# Patient Record
Sex: MALE | Race: BLACK OR AFRICAN AMERICAN | NOT HISPANIC OR LATINO | Employment: UNEMPLOYED | URBAN - METROPOLITAN AREA
[De-identification: names, ages, dates, MRNs, and addresses within clinical notes are randomized per-mention and may not be internally consistent; named-entity substitution may affect disease eponyms.]

---

## 2023-09-30 ENCOUNTER — HOSPITAL ENCOUNTER (EMERGENCY)
Facility: HOSPITAL | Age: 63
Discharge: HOME | End: 2023-09-30
Payer: MEDICARE

## 2023-09-30 PROCEDURE — 99281 EMR DPT VST MAYX REQ PHY/QHP: CPT

## 2023-09-30 PROCEDURE — 4500999001 HC ED NO CHARGE

## 2023-10-01 ENCOUNTER — PHARMACY VISIT (OUTPATIENT)
Dept: PHARMACY | Facility: CLINIC | Age: 63
End: 2023-10-01
Payer: COMMERCIAL

## 2023-11-26 ENCOUNTER — HOSPITAL ENCOUNTER (INPATIENT)
Facility: HOSPITAL | Age: 63
LOS: 4 days | Discharge: HOME | DRG: 304 | End: 2023-11-30
Attending: EMERGENCY MEDICINE | Admitting: STUDENT IN AN ORGANIZED HEALTH CARE EDUCATION/TRAINING PROGRAM
Payer: MEDICARE

## 2023-11-26 ENCOUNTER — APPOINTMENT (OUTPATIENT)
Dept: RADIOLOGY | Facility: HOSPITAL | Age: 63
DRG: 304 | End: 2023-11-26
Payer: MEDICARE

## 2023-11-26 ENCOUNTER — ANCILLARY PROCEDURE (OUTPATIENT)
Dept: EMERGENCY MEDICINE | Facility: HOSPITAL | Age: 63
DRG: 304 | End: 2023-11-26
Payer: MEDICARE

## 2023-11-26 DIAGNOSIS — I16.1 HYPERTENSIVE EMERGENCY: Primary | ICD-10-CM

## 2023-11-26 DIAGNOSIS — R06.02 SHORTNESS OF BREATH: ICD-10-CM

## 2023-11-26 LAB
ALBUMIN SERPL BCP-MCNC: 3.7 G/DL (ref 3.4–5)
ALP SERPL-CCNC: 82 U/L (ref 33–136)
ALT SERPL W P-5'-P-CCNC: 12 U/L (ref 10–52)
ANION GAP BLDV CALCULATED.4IONS-SCNC: 10 MMOL/L (ref 10–25)
ANION GAP SERPL CALC-SCNC: 18 MMOL/L (ref 10–20)
AST SERPL W P-5'-P-CCNC: 22 U/L (ref 9–39)
ATRIAL RATE: 74 BPM
BASE EXCESS BLDV CALC-SCNC: 8.5 MMOL/L (ref -2–3)
BASOPHILS # BLD AUTO: 0.08 X10*3/UL (ref 0–0.1)
BASOPHILS NFR BLD AUTO: 1.3 %
BILIRUB SERPL-MCNC: 0.5 MG/DL (ref 0–1.2)
BNP SERPL-MCNC: 2725 PG/ML (ref 0–99)
BODY TEMPERATURE: 37 DEGREES CELSIUS
BUN SERPL-MCNC: 47 MG/DL (ref 6–23)
CA-I BLDV-SCNC: 1.09 MMOL/L (ref 1.1–1.33)
CALCIUM SERPL-MCNC: 9.2 MG/DL (ref 8.6–10.6)
CARDIAC TROPONIN I PNL SERPL HS: 73 NG/L (ref 0–53)
CARDIAC TROPONIN I PNL SERPL HS: 78 NG/L (ref 0–53)
CHLORIDE BLDV-SCNC: 100 MMOL/L (ref 98–107)
CHLORIDE SERPL-SCNC: 97 MMOL/L (ref 98–107)
CO2 SERPL-SCNC: 33 MMOL/L (ref 21–32)
CREAT SERPL-MCNC: 12.46 MG/DL (ref 0.5–1.3)
EOSINOPHIL # BLD AUTO: 0.52 X10*3/UL (ref 0–0.7)
EOSINOPHIL NFR BLD AUTO: 8.6 %
ERYTHROCYTE [DISTWIDTH] IN BLOOD BY AUTOMATED COUNT: 15.9 % (ref 11.5–14.5)
FLUAV RNA RESP QL NAA+PROBE: NOT DETECTED
FLUBV RNA RESP QL NAA+PROBE: NOT DETECTED
GFR SERPL CREATININE-BSD FRML MDRD: 4 ML/MIN/1.73M*2
GLUCOSE BLDV-MCNC: 75 MG/DL (ref 74–99)
GLUCOSE SERPL-MCNC: 86 MG/DL (ref 74–99)
HCO3 BLDV-SCNC: 33.4 MMOL/L (ref 22–26)
HCT VFR BLD AUTO: 32.6 % (ref 41–52)
HCT VFR BLD EST: 33 % (ref 41–52)
HGB BLD-MCNC: 10.6 G/DL (ref 13.5–17.5)
HGB BLDV-MCNC: 10.9 G/DL (ref 13.5–17.5)
IMM GRANULOCYTES # BLD AUTO: 0.01 X10*3/UL (ref 0–0.7)
IMM GRANULOCYTES NFR BLD AUTO: 0.2 % (ref 0–0.9)
LACTATE BLDV-SCNC: 0.5 MMOL/L (ref 0.4–2)
LYMPHOCYTES # BLD AUTO: 1.25 X10*3/UL (ref 1.2–4.8)
LYMPHOCYTES NFR BLD AUTO: 20.8 %
MAGNESIUM SERPL-MCNC: 1.83 MG/DL (ref 1.6–2.4)
MCH RBC QN AUTO: 29.5 PG (ref 26–34)
MCHC RBC AUTO-ENTMCNC: 32.5 G/DL (ref 32–36)
MCV RBC AUTO: 91 FL (ref 80–100)
MONOCYTES # BLD AUTO: 0.76 X10*3/UL (ref 0.1–1)
MONOCYTES NFR BLD AUTO: 12.6 %
NEUTROPHILS # BLD AUTO: 3.4 X10*3/UL (ref 1.2–7.7)
NEUTROPHILS NFR BLD AUTO: 56.5 %
NRBC BLD-RTO: 0 /100 WBCS (ref 0–0)
OXYHGB MFR BLDV: 88.4 % (ref 45–75)
P AXIS: 74 DEGREES
P OFFSET: 172 MS
P ONSET: 118 MS
PCO2 BLDV: 47 MM HG (ref 41–51)
PH BLDV: 7.46 PH (ref 7.33–7.43)
PHOSPHATE SERPL-MCNC: 6.2 MG/DL (ref 2.5–4.9)
PLATELET # BLD AUTO: 158 X10*3/UL (ref 150–450)
PO2 BLDV: 65 MM HG (ref 35–45)
POTASSIUM BLDV-SCNC: 4.7 MMOL/L (ref 3.5–5.3)
POTASSIUM SERPL-SCNC: 4.6 MMOL/L (ref 3.5–5.3)
PR INTERVAL: 190 MS
PROT SERPL-MCNC: 7.1 G/DL (ref 6.4–8.2)
Q ONSET: 213 MS
QRS COUNT: 12 BEATS
QRS DURATION: 104 MS
QT INTERVAL: 456 MS
QTC CALCULATION(BAZETT): 506 MS
QTC FREDERICIA: 489 MS
R AXIS: 106 DEGREES
RBC # BLD AUTO: 3.59 X10*6/UL (ref 4.5–5.9)
SAO2 % BLDV: 91 % (ref 45–75)
SARS-COV-2 RNA RESP QL NAA+PROBE: NOT DETECTED
SODIUM BLDV-SCNC: 139 MMOL/L (ref 136–145)
SODIUM SERPL-SCNC: 143 MMOL/L (ref 136–145)
T AXIS: 12 DEGREES
T OFFSET: 441 MS
VENTRICULAR RATE: 74 BPM
WBC # BLD AUTO: 6 X10*3/UL (ref 4.4–11.3)

## 2023-11-26 PROCEDURE — 2500000005 HC RX 250 GENERAL PHARMACY W/O HCPCS: Mod: SE

## 2023-11-26 PROCEDURE — 84484 ASSAY OF TROPONIN QUANT: CPT | Performed by: EMERGENCY MEDICINE

## 2023-11-26 PROCEDURE — 94660 CPAP INITIATION&MGMT: CPT

## 2023-11-26 PROCEDURE — 36415 COLL VENOUS BLD VENIPUNCTURE: CPT | Performed by: EMERGENCY MEDICINE

## 2023-11-26 PROCEDURE — 94762 N-INVAS EAR/PLS OXIMTRY CONT: CPT

## 2023-11-26 PROCEDURE — 99291 CRITICAL CARE FIRST HOUR: CPT | Performed by: EMERGENCY MEDICINE

## 2023-11-26 PROCEDURE — 71045 X-RAY EXAM CHEST 1 VIEW: CPT | Performed by: STUDENT IN AN ORGANIZED HEALTH CARE EDUCATION/TRAINING PROGRAM

## 2023-11-26 PROCEDURE — 83735 ASSAY OF MAGNESIUM: CPT | Performed by: EMERGENCY MEDICINE

## 2023-11-26 PROCEDURE — 84100 ASSAY OF PHOSPHORUS: CPT | Performed by: EMERGENCY MEDICINE

## 2023-11-26 PROCEDURE — 85025 COMPLETE CBC W/AUTO DIFF WBC: CPT | Performed by: EMERGENCY MEDICINE

## 2023-11-26 PROCEDURE — 83036 HEMOGLOBIN GLYCOSYLATED A1C: CPT

## 2023-11-26 PROCEDURE — 83880 ASSAY OF NATRIURETIC PEPTIDE: CPT | Performed by: EMERGENCY MEDICINE

## 2023-11-26 PROCEDURE — 82330 ASSAY OF CALCIUM: CPT

## 2023-11-26 PROCEDURE — 87636 SARSCOV2 & INF A&B AMP PRB: CPT

## 2023-11-26 PROCEDURE — 80053 COMPREHEN METABOLIC PANEL: CPT | Performed by: EMERGENCY MEDICINE

## 2023-11-26 PROCEDURE — 96374 THER/PROPH/DIAG INJ IV PUSH: CPT

## 2023-11-26 PROCEDURE — 2020000001 HC ICU ROOM DAILY

## 2023-11-26 PROCEDURE — 96375 TX/PRO/DX INJ NEW DRUG ADDON: CPT

## 2023-11-26 PROCEDURE — 2500000005 HC RX 250 GENERAL PHARMACY W/O HCPCS: Mod: SE | Performed by: EMERGENCY MEDICINE

## 2023-11-26 PROCEDURE — 2500000001 HC RX 250 WO HCPCS SELF ADMINISTERED DRUGS (ALT 637 FOR MEDICARE OP): Mod: SE

## 2023-11-26 PROCEDURE — 2500000004 HC RX 250 GENERAL PHARMACY W/ HCPCS (ALT 636 FOR OP/ED): Mod: SE

## 2023-11-26 PROCEDURE — 93005 ELECTROCARDIOGRAM TRACING: CPT

## 2023-11-26 PROCEDURE — 5A09357 ASSISTANCE WITH RESPIRATORY VENTILATION, LESS THAN 24 CONSECUTIVE HOURS, CONTINUOUS POSITIVE AIRWAY PRESSURE: ICD-10-PCS | Performed by: INTERNAL MEDICINE

## 2023-11-26 PROCEDURE — 96376 TX/PRO/DX INJ SAME DRUG ADON: CPT

## 2023-11-26 PROCEDURE — 71045 X-RAY EXAM CHEST 1 VIEW: CPT

## 2023-11-26 PROCEDURE — 82728 ASSAY OF FERRITIN: CPT

## 2023-11-26 RX ORDER — ASPIRIN 325 MG
325 TABLET ORAL ONCE
Status: COMPLETED | OUTPATIENT
Start: 2023-11-26 | End: 2023-11-26

## 2023-11-26 RX ORDER — ACETAMINOPHEN 325 MG/1
975 TABLET ORAL ONCE
Status: COMPLETED | OUTPATIENT
Start: 2023-11-26 | End: 2023-11-26

## 2023-11-26 RX ORDER — NITROGLYCERIN 20 MG/100ML
5-200 INJECTION INTRAVENOUS CONTINUOUS
Status: DISCONTINUED | OUTPATIENT
Start: 2023-11-26 | End: 2023-11-27

## 2023-11-26 RX ORDER — NITROGLYCERIN 0.4 MG/1
0.4 TABLET SUBLINGUAL ONCE
Status: COMPLETED | OUTPATIENT
Start: 2023-11-26 | End: 2023-11-26

## 2023-11-26 RX ORDER — HYDRALAZINE HYDROCHLORIDE 20 MG/ML
5 INJECTION INTRAMUSCULAR; INTRAVENOUS ONCE
Status: COMPLETED | OUTPATIENT
Start: 2023-11-26 | End: 2023-11-26

## 2023-11-26 RX ORDER — NAPROXEN SODIUM 220 MG/1
TABLET, FILM COATED ORAL
Status: COMPLETED
Start: 2023-11-26 | End: 2023-11-27

## 2023-11-26 RX ORDER — HYDRALAZINE HYDROCHLORIDE 20 MG/ML
10 INJECTION INTRAMUSCULAR; INTRAVENOUS ONCE
Status: COMPLETED | OUTPATIENT
Start: 2023-11-26 | End: 2023-11-26

## 2023-11-26 RX ORDER — NITROGLYCERIN 20 MG/100ML
INJECTION INTRAVENOUS
Status: COMPLETED
Start: 2023-11-26 | End: 2023-11-26

## 2023-11-26 RX ADMIN — HYDRALAZINE HYDROCHLORIDE 5 MG: 20 INJECTION INTRAMUSCULAR; INTRAVENOUS at 21:23

## 2023-11-26 RX ADMIN — HYDRALAZINE HYDROCHLORIDE 10 MG: 20 INJECTION INTRAMUSCULAR; INTRAVENOUS at 22:24

## 2023-11-26 RX ADMIN — NITROGLYCERIN 0.4 MG: 0.4 TABLET SUBLINGUAL at 23:10

## 2023-11-26 RX ADMIN — ACETAMINOPHEN 975 MG: 325 TABLET ORAL at 21:23

## 2023-11-26 RX ADMIN — Medication 30 PERCENT: at 22:50

## 2023-11-26 RX ADMIN — NITROGLYCERIN 10 MCG/MIN: 20 INJECTION INTRAVENOUS at 23:33

## 2023-11-26 RX ADMIN — ASPIRIN 325 MG ORAL TABLET 325 MG: 325 PILL ORAL at 21:23

## 2023-11-26 RX ADMIN — NITROGLYCERIN 0.4 MG: 0.4 TABLET SUBLINGUAL at 21:29

## 2023-11-26 RX ADMIN — NITROGLYCERIN 50000 MCG: 20 INJECTION INTRAVENOUS at 23:11

## 2023-11-26 ASSESSMENT — COLUMBIA-SUICIDE SEVERITY RATING SCALE - C-SSRS
2. HAVE YOU ACTUALLY HAD ANY THOUGHTS OF KILLING YOURSELF?: NO
1. IN THE PAST MONTH, HAVE YOU WISHED YOU WERE DEAD OR WISHED YOU COULD GO TO SLEEP AND NOT WAKE UP?: NO
6. HAVE YOU EVER DONE ANYTHING, STARTED TO DO ANYTHING, OR PREPARED TO DO ANYTHING TO END YOUR LIFE?: NO

## 2023-11-26 ASSESSMENT — LIFESTYLE VARIABLES
EVER HAD A DRINK FIRST THING IN THE MORNING TO STEADY YOUR NERVES TO GET RID OF A HANGOVER: NO
REASON UNABLE TO ASSESS: NO
HAVE YOU EVER FELT YOU SHOULD CUT DOWN ON YOUR DRINKING: NO
EVER FELT BAD OR GUILTY ABOUT YOUR DRINKING: NO
HAVE PEOPLE ANNOYED YOU BY CRITICIZING YOUR DRINKING: NO

## 2023-11-26 ASSESSMENT — PAIN - FUNCTIONAL ASSESSMENT: PAIN_FUNCTIONAL_ASSESSMENT: 0-10

## 2023-11-26 ASSESSMENT — PAIN DESCRIPTION - LOCATION: LOCATION: CHEST

## 2023-11-27 ENCOUNTER — APPOINTMENT (OUTPATIENT)
Dept: RADIOLOGY | Facility: HOSPITAL | Age: 63
DRG: 304 | End: 2023-11-27
Payer: MEDICARE

## 2023-11-27 ENCOUNTER — APPOINTMENT (OUTPATIENT)
Dept: DIALYSIS | Facility: HOSPITAL | Age: 63
End: 2023-11-27
Payer: MEDICARE

## 2023-11-27 ENCOUNTER — HOSPITAL ENCOUNTER (OUTPATIENT)
Dept: CARDIOLOGY | Facility: CLINIC | Age: 63
Discharge: HOME | End: 2023-11-27
Payer: MEDICARE

## 2023-11-27 LAB
ALBUMIN SERPL BCP-MCNC: 4.2 G/DL (ref 3.4–5)
ANION GAP SERPL CALC-SCNC: 19 MMOL/L (ref 10–20)
BASOPHILS # BLD AUTO: 0.1 X10*3/UL (ref 0–0.1)
BASOPHILS NFR BLD AUTO: 1.4 %
BUN SERPL-MCNC: 28 MG/DL (ref 6–23)
CALCIUM SERPL-MCNC: 9.1 MG/DL (ref 8.6–10.6)
CHLORIDE SERPL-SCNC: 96 MMOL/L (ref 98–107)
CO2 SERPL-SCNC: 28 MMOL/L (ref 21–32)
CREAT SERPL-MCNC: 8.03 MG/DL (ref 0.5–1.3)
EOSINOPHIL # BLD AUTO: 0.55 X10*3/UL (ref 0–0.7)
EOSINOPHIL NFR BLD AUTO: 7.5 %
ERYTHROCYTE [DISTWIDTH] IN BLOOD BY AUTOMATED COUNT: 16.1 % (ref 11.5–14.5)
EST. AVERAGE GLUCOSE BLD GHB EST-MCNC: 82 MG/DL
FERRITIN SERPL-MCNC: 681 NG/ML (ref 20–300)
FOLATE SERPL-MCNC: 14.6 NG/ML
GFR SERPL CREATININE-BSD FRML MDRD: 7 ML/MIN/1.73M*2
GLUCOSE BLD MANUAL STRIP-MCNC: 133 MG/DL (ref 74–99)
GLUCOSE BLD MANUAL STRIP-MCNC: 65 MG/DL (ref 74–99)
GLUCOSE SERPL-MCNC: 94 MG/DL (ref 74–99)
HBA1C MFR BLD: 4.5 %
HCT VFR BLD AUTO: 33.9 % (ref 41–52)
HGB BLD-MCNC: 10.9 G/DL (ref 13.5–17.5)
IMM GRANULOCYTES # BLD AUTO: 0.02 X10*3/UL (ref 0–0.7)
IMM GRANULOCYTES NFR BLD AUTO: 0.3 % (ref 0–0.9)
IRON SATN MFR SERPL: 19 %
IRON SERPL-MCNC: 44 UG/DL (ref 35–150)
LYMPHOCYTES # BLD AUTO: 1.21 X10*3/UL (ref 1.2–4.8)
LYMPHOCYTES NFR BLD AUTO: 16.5 %
MAGNESIUM SERPL-MCNC: 1.93 MG/DL (ref 1.6–2.4)
MCH RBC QN AUTO: 29.2 PG (ref 26–34)
MCHC RBC AUTO-ENTMCNC: 32.2 G/DL (ref 32–36)
MCV RBC AUTO: 91 FL (ref 80–100)
MONOCYTES # BLD AUTO: 0.82 X10*3/UL (ref 0.1–1)
MONOCYTES NFR BLD AUTO: 11.2 %
NEUTROPHILS # BLD AUTO: 4.64 X10*3/UL (ref 1.2–7.7)
NEUTROPHILS NFR BLD AUTO: 63.1 %
NRBC BLD-RTO: 0 /100 WBCS (ref 0–0)
PHOSPHATE SERPL-MCNC: 3.6 MG/DL (ref 2.5–4.9)
PLATELET # BLD AUTO: 172 X10*3/UL (ref 150–450)
POTASSIUM SERPL-SCNC: 4.5 MMOL/L (ref 3.5–5.3)
RBC # BLD AUTO: 3.73 X10*6/UL (ref 4.5–5.9)
SODIUM SERPL-SCNC: 138 MMOL/L (ref 136–145)
TIBC SERPL-MCNC: 226 UG/DL
UIBC SERPL-MCNC: 182 UG/DL (ref 110–370)
VIT B12 SERPL-MCNC: 406 PG/ML (ref 211–911)
WBC # BLD AUTO: 7.3 X10*3/UL (ref 4.4–11.3)

## 2023-11-27 PROCEDURE — 93971 EXTREMITY STUDY: CPT

## 2023-11-27 PROCEDURE — 93971 EXTREMITY STUDY: CPT | Performed by: RADIOLOGY

## 2023-11-27 PROCEDURE — 83735 ASSAY OF MAGNESIUM: CPT

## 2023-11-27 PROCEDURE — 99233 SBSQ HOSP IP/OBS HIGH 50: CPT

## 2023-11-27 PROCEDURE — 94762 N-INVAS EAR/PLS OXIMTRY CONT: CPT

## 2023-11-27 PROCEDURE — 82746 ASSAY OF FOLIC ACID SERUM: CPT

## 2023-11-27 PROCEDURE — 2500000001 HC RX 250 WO HCPCS SELF ADMINISTERED DRUGS (ALT 637 FOR MEDICARE OP)

## 2023-11-27 PROCEDURE — 99291 CRITICAL CARE FIRST HOUR: CPT

## 2023-11-27 PROCEDURE — 8010000001 HC DIALYSIS - HEMODIALYSIS PER DAY

## 2023-11-27 PROCEDURE — 36415 COLL VENOUS BLD VENIPUNCTURE: CPT

## 2023-11-27 PROCEDURE — 82607 VITAMIN B-12: CPT

## 2023-11-27 PROCEDURE — 1200000002 HC GENERAL ROOM WITH TELEMETRY DAILY

## 2023-11-27 PROCEDURE — 83540 ASSAY OF IRON: CPT

## 2023-11-27 PROCEDURE — 5A1D70Z PERFORMANCE OF URINARY FILTRATION, INTERMITTENT, LESS THAN 6 HOURS PER DAY: ICD-10-PCS | Performed by: INTERNAL MEDICINE

## 2023-11-27 PROCEDURE — 85025 COMPLETE CBC W/AUTO DIFF WBC: CPT

## 2023-11-27 PROCEDURE — 2500000004 HC RX 250 GENERAL PHARMACY W/ HCPCS (ALT 636 FOR OP/ED)

## 2023-11-27 PROCEDURE — 99223 1ST HOSP IP/OBS HIGH 75: CPT

## 2023-11-27 PROCEDURE — 99221 1ST HOSP IP/OBS SF/LOW 40: CPT | Performed by: INTERNAL MEDICINE

## 2023-11-27 PROCEDURE — 93010 ELECTROCARDIOGRAM REPORT: CPT | Performed by: INTERNAL MEDICINE

## 2023-11-27 PROCEDURE — 80069 RENAL FUNCTION PANEL: CPT

## 2023-11-27 PROCEDURE — 93005 ELECTROCARDIOGRAM TRACING: CPT

## 2023-11-27 PROCEDURE — 82947 ASSAY GLUCOSE BLOOD QUANT: CPT

## 2023-11-27 RX ORDER — HYDRALAZINE HYDROCHLORIDE 50 MG/1
100 TABLET, FILM COATED ORAL EVERY 8 HOURS
Status: DISCONTINUED | OUTPATIENT
Start: 2023-11-27 | End: 2023-11-27

## 2023-11-27 RX ORDER — HYDRALAZINE HYDROCHLORIDE 50 MG/1
100 TABLET, FILM COATED ORAL EVERY 8 HOURS
Status: DISCONTINUED | OUTPATIENT
Start: 2023-11-27 | End: 2023-11-30 | Stop reason: HOSPADM

## 2023-11-27 RX ORDER — HEPARIN SODIUM 5000 [USP'U]/ML
5000 INJECTION, SOLUTION INTRAVENOUS; SUBCUTANEOUS EVERY 8 HOURS
Status: DISCONTINUED | OUTPATIENT
Start: 2023-11-27 | End: 2023-11-30 | Stop reason: HOSPADM

## 2023-11-27 RX ORDER — CLONIDINE 0.1 MG/24H
1 PATCH, EXTENDED RELEASE TRANSDERMAL
Status: DISCONTINUED | OUTPATIENT
Start: 2023-11-28 | End: 2023-11-28

## 2023-11-27 RX ORDER — LABETALOL 200 MG/1
1 TABLET, FILM COATED ORAL EVERY 12 HOURS
Status: DISCONTINUED | OUTPATIENT
Start: 2023-11-27 | End: 2023-11-27

## 2023-11-27 RX ORDER — ATORVASTATIN CALCIUM 40 MG/1
40 TABLET, FILM COATED ORAL DAILY
Status: DISCONTINUED | OUTPATIENT
Start: 2023-11-27 | End: 2023-11-30 | Stop reason: HOSPADM

## 2023-11-27 RX ORDER — LABETALOL 200 MG/1
1 TABLET, FILM COATED ORAL EVERY 12 HOURS
Status: DISCONTINUED | OUTPATIENT
Start: 2023-11-27 | End: 2023-11-29

## 2023-11-27 RX ORDER — NAPROXEN SODIUM 220 MG/1
81 TABLET, FILM COATED ORAL DAILY
Status: DISCONTINUED | OUTPATIENT
Start: 2023-11-27 | End: 2023-11-30 | Stop reason: HOSPADM

## 2023-11-27 RX ORDER — HYDRALAZINE HYDROCHLORIDE 100 MG/1
100 TABLET, FILM COATED ORAL EVERY 8 HOURS
Status: DISCONTINUED | OUTPATIENT
Start: 2023-11-27 | End: 2023-11-27

## 2023-11-27 RX ORDER — HYDRALAZINE HYDROCHLORIDE 25 MG/1
25 TABLET, FILM COATED ORAL ONCE
Status: COMPLETED | OUTPATIENT
Start: 2023-11-27 | End: 2023-11-27

## 2023-11-27 RX ORDER — NIFEDIPINE 60 MG/1
60 TABLET, FILM COATED, EXTENDED RELEASE ORAL DAILY
Status: DISCONTINUED | OUTPATIENT
Start: 2023-11-27 | End: 2023-11-27

## 2023-11-27 RX ORDER — LOSARTAN POTASSIUM 100 MG/1
100 TABLET ORAL DAILY
Status: DISCONTINUED | OUTPATIENT
Start: 2023-11-27 | End: 2023-11-27

## 2023-11-27 RX ORDER — NITROGLYCERIN 20 MG/100ML
5-200 INJECTION INTRAVENOUS CONTINUOUS
Status: DISCONTINUED | OUTPATIENT
Start: 2023-11-27 | End: 2023-11-27

## 2023-11-27 RX ORDER — LOSARTAN POTASSIUM 100 MG/1
100 TABLET ORAL DAILY
Status: DISCONTINUED | OUTPATIENT
Start: 2023-11-27 | End: 2023-11-30 | Stop reason: HOSPADM

## 2023-11-27 RX ORDER — NIFEDIPINE 60 MG/1
60 TABLET, FILM COATED, EXTENDED RELEASE ORAL DAILY
Status: DISCONTINUED | OUTPATIENT
Start: 2023-11-27 | End: 2023-11-28

## 2023-11-27 RX ADMIN — HYDRALAZINE HYDROCHLORIDE 100 MG: 50 TABLET, FILM COATED ORAL at 12:48

## 2023-11-27 RX ADMIN — NIFEDIPINE 60 MG: 60 TABLET, FILM COATED, EXTENDED RELEASE ORAL at 09:16

## 2023-11-27 RX ADMIN — LABETALOL HYDROCHLORIDE 200 MG: 200 TABLET, FILM COATED ORAL at 19:41

## 2023-11-27 RX ADMIN — HYDRALAZINE HYDROCHLORIDE 25 MG: 25 TABLET ORAL at 14:15

## 2023-11-27 RX ADMIN — HYDRALAZINE HYDROCHLORIDE 100 MG: 50 TABLET, FILM COATED ORAL at 19:42

## 2023-11-27 RX ADMIN — LABETALOL HYDROCHLORIDE 200 MG: 200 TABLET, FILM COATED ORAL at 04:46

## 2023-11-27 RX ADMIN — RENO CAPS 1 CAPSULE: 100; 1.5; 1.7; 20; 10; 1; 150; 5; 6 CAPSULE ORAL at 09:16

## 2023-11-27 RX ADMIN — HYDRALAZINE HYDROCHLORIDE 100 MG: 50 TABLET, FILM COATED ORAL at 04:06

## 2023-11-27 RX ADMIN — LOSARTAN POTASSIUM 100 MG: 100 TABLET, FILM COATED ORAL at 09:15

## 2023-11-27 RX ADMIN — ATORVASTATIN CALCIUM 40 MG: 40 TABLET, FILM COATED ORAL at 09:15

## 2023-11-27 RX ADMIN — ASPIRIN 81 MG CHEWABLE TABLET 81 MG: 81 TABLET CHEWABLE at 09:16

## 2023-11-27 SDOH — ECONOMIC STABILITY: HOUSING INSECURITY: IN THE LAST 12 MONTHS, HOW MANY PLACES HAVE YOU LIVED?: 1

## 2023-11-27 SDOH — ECONOMIC STABILITY: FOOD INSECURITY: WITHIN THE PAST 12 MONTHS, THE FOOD YOU BOUGHT JUST DIDN'T LAST AND YOU DIDN'T HAVE MONEY TO GET MORE.: SOMETIMES TRUE

## 2023-11-27 SDOH — SOCIAL STABILITY: SOCIAL INSECURITY: ARE THERE ANY APPARENT SIGNS OF INJURIES/BEHAVIORS THAT COULD BE RELATED TO ABUSE/NEGLECT?: NO

## 2023-11-27 SDOH — ECONOMIC STABILITY: INCOME INSECURITY: HOW HARD IS IT FOR YOU TO PAY FOR THE VERY BASICS LIKE FOOD, HOUSING, MEDICAL CARE, AND HEATING?: SOMEWHAT HARD

## 2023-11-27 SDOH — SOCIAL STABILITY: SOCIAL INSECURITY: ARE YOU OR HAVE YOU BEEN THREATENED OR ABUSED PHYSICALLY, EMOTIONALLY, OR SEXUALLY BY ANYONE?: NO

## 2023-11-27 SDOH — ECONOMIC STABILITY: HOUSING INSECURITY
IN THE LAST 12 MONTHS, WAS THERE A TIME WHEN YOU DID NOT HAVE A STEADY PLACE TO SLEEP OR SLEPT IN A SHELTER (INCLUDING NOW)?: NO

## 2023-11-27 SDOH — SOCIAL STABILITY: SOCIAL INSECURITY: DO YOU FEEL ANYONE HAS EXPLOITED OR TAKEN ADVANTAGE OF YOU FINANCIALLY OR OF YOUR PERSONAL PROPERTY?: NO

## 2023-11-27 SDOH — ECONOMIC STABILITY: FOOD INSECURITY: WITHIN THE PAST 12 MONTHS, YOU WORRIED THAT YOUR FOOD WOULD RUN OUT BEFORE YOU GOT MONEY TO BUY MORE.: SOMETIMES TRUE

## 2023-11-27 SDOH — ECONOMIC STABILITY: TRANSPORTATION INSECURITY
IN THE PAST 12 MONTHS, HAS LACK OF TRANSPORTATION KEPT YOU FROM MEETINGS, WORK, OR FROM GETTING THINGS NEEDED FOR DAILY LIVING?: NO

## 2023-11-27 SDOH — SOCIAL STABILITY: SOCIAL INSECURITY: DOES ANYONE TRY TO KEEP YOU FROM HAVING/CONTACTING OTHER FRIENDS OR DOING THINGS OUTSIDE YOUR HOME?: NO

## 2023-11-27 SDOH — SOCIAL STABILITY: SOCIAL INSECURITY: DO YOU FEEL UNSAFE GOING BACK TO THE PLACE WHERE YOU ARE LIVING?: NO

## 2023-11-27 SDOH — ECONOMIC STABILITY: INCOME INSECURITY: IN THE PAST 12 MONTHS, HAS THE ELECTRIC, GAS, OIL, OR WATER COMPANY THREATENED TO SHUT OFF SERVICE IN YOUR HOME?: NO

## 2023-11-27 SDOH — ECONOMIC STABILITY: INCOME INSECURITY: IN THE LAST 12 MONTHS, WAS THERE A TIME WHEN YOU WERE NOT ABLE TO PAY THE MORTGAGE OR RENT ON TIME?: NO

## 2023-11-27 SDOH — SOCIAL STABILITY: SOCIAL INSECURITY: ABUSE: ADULT

## 2023-11-27 SDOH — SOCIAL STABILITY: SOCIAL INSECURITY: HAS ANYONE EVER THREATENED TO HURT YOUR FAMILY OR YOUR PETS?: NO

## 2023-11-27 SDOH — ECONOMIC STABILITY: TRANSPORTATION INSECURITY
IN THE PAST 12 MONTHS, HAS THE LACK OF TRANSPORTATION KEPT YOU FROM MEDICAL APPOINTMENTS OR FROM GETTING MEDICATIONS?: NO

## 2023-11-27 SDOH — SOCIAL STABILITY: SOCIAL INSECURITY: HAVE YOU HAD THOUGHTS OF HARMING ANYONE ELSE?: NO

## 2023-11-27 ASSESSMENT — LIFESTYLE VARIABLES
HOW OFTEN DO YOU HAVE A DRINK CONTAINING ALCOHOL: MONTHLY OR LESS
HOW MANY STANDARD DRINKS CONTAINING ALCOHOL DO YOU HAVE ON A TYPICAL DAY: 1 OR 2
HOW OFTEN DO YOU HAVE 6 OR MORE DRINKS ON ONE OCCASION: NEVER
SKIP TO QUESTIONS 9-10: 1
AUDIT-C TOTAL SCORE: 1
AUDIT-C TOTAL SCORE: 1
SUBSTANCE_ABUSE_PAST_12_MONTHS: YES
PRESCIPTION_ABUSE_PAST_12_MONTHS: NO

## 2023-11-27 ASSESSMENT — COGNITIVE AND FUNCTIONAL STATUS - GENERAL
DAILY ACTIVITIY SCORE: 24
DAILY ACTIVITIY SCORE: 24
STANDING UP FROM CHAIR USING ARMS: A LITTLE
CLIMB 3 TO 5 STEPS WITH RAILING: A LITTLE
MOBILITY SCORE: 20
MOBILITY SCORE: 24
WALKING IN HOSPITAL ROOM: A LITTLE
PATIENT BASELINE BEDBOUND: NO
MOVING TO AND FROM BED TO CHAIR: A LITTLE

## 2023-11-27 ASSESSMENT — PAIN - FUNCTIONAL ASSESSMENT
PAIN_FUNCTIONAL_ASSESSMENT: 0-10

## 2023-11-27 ASSESSMENT — PAIN SCALES - GENERAL
PAINLEVEL_OUTOF10: 0 - NO PAIN

## 2023-11-27 ASSESSMENT — PATIENT HEALTH QUESTIONNAIRE - PHQ9
1. LITTLE INTEREST OR PLEASURE IN DOING THINGS: NOT AT ALL
2. FEELING DOWN, DEPRESSED OR HOPELESS: NOT AT ALL
SUM OF ALL RESPONSES TO PHQ9 QUESTIONS 1 & 2: 0

## 2023-11-27 ASSESSMENT — ACTIVITIES OF DAILY LIVING (ADL)
FEEDING YOURSELF: INDEPENDENT
BATHING: INDEPENDENT
GROOMING: INDEPENDENT
HEARING - RIGHT EAR: FUNCTIONAL
LACK_OF_TRANSPORTATION: NO
JUDGMENT_ADEQUATE_SAFELY_COMPLETE_DAILY_ACTIVITIES: YES
WALKS IN HOME: INDEPENDENT
HEARING - LEFT EAR: FUNCTIONAL
ADEQUATE_TO_COMPLETE_ADL: YES
PATIENT'S MEMORY ADEQUATE TO SAFELY COMPLETE DAILY ACTIVITIES?: YES
TOILETING: INDEPENDENT
DRESSING YOURSELF: INDEPENDENT

## 2023-11-27 NOTE — PROGRESS NOTES
Marcelino Connors is a 62 y.o. male on day 1 of admission presenting with Hypertensive emergency.  The patient was not available to participate in the assessment process. I called Namrata, who is listed as his POA. Per Namrata the patient lives with his brother Josi. He receives less than $100 for food stamps. If/when needed the family member will pool together to make sure there enough food in the house. I had no other concerns that would delay his dc. Sw will continue to follow for psychosocial needs and discharge planning.   EDGARD Powell

## 2023-11-27 NOTE — SIGNIFICANT EVENT
Floor Readiness Note       I, personally, evaluated Marcelino Connors prior to transfer to the floor, including reviewing all current laboratory and imaging studies. The patient remains appropriate for transfer to the floor. Bedside nurse and respiratory therapy are also in agreement of patient's readiness for the floor.     Brief summary:  Mr. Marcelino Connors is a 63 yo M w/ PMH of anuric ESRD (on HD MWF), HTN, and HFpEF (EF 60-65% 3/8/2022) 2/2 chronic HTN here for acute hypoxic respiratory failure 2/2 pulmonary edema d/t hypertensive emergency, and volume overload. Nephrology was consulted and patient underwent urgent HD (now s/p 3L removed) BP has since improved and nitroglycerin GGT has since been dc. His respiratory function has since returned to baseline and home BP meds were resumed.      Updated focused Physical Exam:  Physical Exam:   General: NAD, L UE AVF w/ audible bruit  Cardio: RRR, no rubs/murmurs, no S3/S4  Pulm: non-labored, no accessory muscle usage, no crackles/wheezing/stridor  GI: non-distended, appropriately soft, no masses, non-tender  : no CVA tenderness  MSK: no joint swelling, grossly full active ROM  Extremities: no cyanosis, distal extremities warm, no bilat LE edema  HEENT: grossly normocephalic  Neuro: grossly oriented, CN grossly intact, extremities moving symmetrically  Psych: appropriate affect    Current Vital Signs:  Heart Rate: 73 (11/27/23 1200 : Ingrid Bush RN)  BP: (!) 189/89 (11/27/23 1200 : Ingrid Bush, RN)  Temp: 37 °C (98.6 °F) (11/27/23 1100 : Deedee Reese)  Resp: 20 (11/27/23 1200 : Ingrid Bush, RN)  SpO2: 97 % (11/27/23 1200 : Ingrid Bush, RN)    Relevant updates since rounds:  None     Accepting team, Hospitalist C, received verbal sign out and the Provider Care team/Attending has been updated. Bedside nurse will now call accepting nurse for report and patient will be transferred to [place] [id number].    Linda Ferrer MD   PGY-1 Internal Medicine   
Unable to formally assess due to altered mentation with reduced compliance.

## 2023-11-27 NOTE — H&P
S  CC  Acute hypoxic respiratory failure 2/2 pulmonary edema (on CPAP in ED), hypertensive emergency (on nitroglycerin ggt) in s/o volume overload/ESRD    HPI  Mr. Marcelino Connors is a 61 yo M w/ PMH most pertinent for anuric ESRD 2/2 hypertensive nephropathy now on iHD [facility] ([facility]) via L UE AVF, HTN c/b multiple presentations for urgency/emergency, and HFpEF (EF 60-65% 3/8/2022) likely 2/2 chronic HTN.    Presented to hospital d/t new dyspnea this AM. Does not wear supplemental O2 at home. Per pt last iHD session at [facility] was Friday 11/24. Thinks they took off about 2 L, says tolerated full session. Does not make urine at baseline. Not sure what meds he takes but says takes about 8 pills a day, some once daily, some twice daily, and some thrice daily. Has been eating more salty foods over the holiday.    No new neuro deficits. No headache, dizziness, or LH. No CP. SOB now improved back to baseline on arrival to MICU. No abdominal pain. No n/v/d/c. No new LE pain or swelling. No fevers, chills, or sweats.    No headache on the nitroglycerin ggt.    ED course:  VT: T 98.8, P 77, /120, SpO2 96% (on LFNC). CBC w/o leukocytosis (WBC 6.0), Hb at baseline (10.6, recent baseline 10-11, previously 14-16), . BMP notable for K 4.6, HCO3 33, BUN 47, P 6.2. LFTs grossly WNL. BNP 2725 (baseline 1,629) in s/o BMI 21. Troponin plateaued, 79 to 73. VBG: pH 7.46, pCO2 47, lactate 0.5. Covid neg. Flu A/B neg. CXR w/ pulmonary edema and small b/l pleural effusions. Received hydralazine 10mg IV, hydralazine 5mgt IV, aspirin 325mg, nitroglycerin 0.4mg SL x3. Started on nitroglycerin ggt.    CARDs Hx:  TTE 3/8/2022:  1. The left ventricular systolic function is normal with a 60-65% estimated ejection fraction.  2. Spectral Doppler shows a restrictive pattern of left ventricular diastolic filling.  3. There is severe concentric left ventricular hypertrophy.  4. The left atrium is severely dilated.  5. The  right atrium is mild to moderately dilated.    ROS  10-point ROS otherwise negative except for above.    PMH  -see HPI    ALL  No Known Allergies     MEDs  Current Outpatient Medications   Medication Instructions    aspirin 81 mg chewable tablet CHEW 1 TABLET BY MOUTH ONCE DAILY    atorvastatin (Lipitor) 40 mg tablet TAKE 1 TABLET BY MOUTH ONCE DAILY    B complex-vitamin C-folic acid (Nephrocaps) 1 mg capsule TAKE 1 CAPSULE BY MOUTH ONCE DAILY    cloNIDine (Catapres-TTS) 0.1 mg/24 hr patch APPLY 1 PATCH TRANSDERMAL ONCE WEEKLY ON TUESDAY.    hydrALAZINE (Apresoline) 100 mg tablet TAKE 1 TABLET BY MOUTH EVERY 8 HOURS    hydrALAZINE (Apresoline) 100 mg tablet TAKE 1 TABLET BY MOUTH EVERY 8 HOURS    isosorbide mononitrate ER (Imdur) 120 mg 24 hr tablet TAKE 1 TABLET BY MOUTH ONCE DAILY.    labetalol (Normodyne) 200 mg tablet TAKE 1 TABLET BY MOUTH EVERY 12 HOURS    losartan (Cozaar) 100 mg tablet TAKE 1 TABLET BY MOUTH ONCE DAILY    losartan (Cozaar) 100 mg tablet TAKE 1 TABLET BY MOUTH ONCE DAILY    NIFEdipine ER (Adalat CC) 60 mg 24 hr tablet TAKE 1 TABLET BY MOUTH ONCE DAILY      PSH  -reviewed (non-contributory)    SOC  -tobacco: denies  -alcohol: denies  -substance: smokes cannabis  -barriers to healthcare: denies  -code status: full  -surrogate decision maker: Luna Robles (adult son) [phone]      O  VT  Temp:  [37 °C (98.6 °F)-37.1 °C (98.8 °F)] 37 °C (98.6 °F)  Heart Rate:  [74-82] 79  Resp:  [13-27] 26  BP: (169-240)/() 169/93  FiO2 (%):  [30 %-36 %] 36 %     RESP  FiO2 (%):  [30 %-36 %] 36 %  S RR:  [16] 16     I/O    Intake/Output Summary (Last 24 hours) at 11/27/2023 0212  Last data filed at 11/27/2023 0130  Gross per 24 hour   Intake 265.28 ml   Output --   Net 265.28 ml      PE  General: no acute distress, on LFNC, L UE AVF w/ audible bruit  Cardio: normal rate, regular rhythm, no rubs/murmurs, no S3/S4  Pulm: non-labored, no accessory muscle usage, BS symmetric, no  crackles/wheezing/stridor  GI: non-distended, appropriately soft, no masses, non-tender  : no CVA tenderness  MSK: no joint swelling, grossly full active ROM  HEENT: grossly normocephalic  Neuro: grossly oriented, CN grossly intact, extremities moving symmetrically  Psych: appropriate affect  Volume: mucous membranes moist, mild LE pitting edema  Perfusion: no cyanosis, distal extremities warm    LAB  Results for orders placed or performed during the hospital encounter of 11/26/23 (from the past 24 hour(s))   CBC and Auto Differential   Result Value Ref Range    WBC 6.0 4.4 - 11.3 x10*3/uL    nRBC 0.0 0.0 - 0.0 /100 WBCs    RBC 3.59 (L) 4.50 - 5.90 x10*6/uL    Hemoglobin 10.6 (L) 13.5 - 17.5 g/dL    Hematocrit 32.6 (L) 41.0 - 52.0 %    MCV 91 80 - 100 fL    MCH 29.5 26.0 - 34.0 pg    MCHC 32.5 32.0 - 36.0 g/dL    RDW 15.9 (H) 11.5 - 14.5 %    Platelets 158 150 - 450 x10*3/uL    Neutrophils % 56.5 40.0 - 80.0 %    Immature Granulocytes %, Automated 0.2 0.0 - 0.9 %    Lymphocytes % 20.8 13.0 - 44.0 %    Monocytes % 12.6 2.0 - 10.0 %    Eosinophils % 8.6 0.0 - 6.0 %    Basophils % 1.3 0.0 - 2.0 %    Neutrophils Absolute 3.40 1.20 - 7.70 x10*3/uL    Immature Granulocytes Absolute, Automated 0.01 0.00 - 0.70 x10*3/uL    Lymphocytes Absolute 1.25 1.20 - 4.80 x10*3/uL    Monocytes Absolute 0.76 0.10 - 1.00 x10*3/uL    Eosinophils Absolute 0.52 0.00 - 0.70 x10*3/uL    Basophils Absolute 0.08 0.00 - 0.10 x10*3/uL   Comprehensive Metabolic Panel   Result Value Ref Range    Glucose 86 74 - 99 mg/dL    Sodium 143 136 - 145 mmol/L    Potassium 4.6 3.5 - 5.3 mmol/L    Chloride 97 (L) 98 - 107 mmol/L    Bicarbonate 33 (H) 21 - 32 mmol/L    Anion Gap 18 10 - 20 mmol/L    Urea Nitrogen 47 (H) 6 - 23 mg/dL    Creatinine 12.46 (H) 0.50 - 1.30 mg/dL    eGFR 4 (L) >60 mL/min/1.73m*2    Calcium 9.2 8.6 - 10.6 mg/dL    Albumin 3.7 3.4 - 5.0 g/dL    Alkaline Phosphatase 82 33 - 136 U/L    Total Protein 7.1 6.4 - 8.2 g/dL    AST 22 9 - 39  U/L    Bilirubin, Total 0.5 0.0 - 1.2 mg/dL    ALT 12 10 - 52 U/L   Troponin I, High Sensitivity   Result Value Ref Range    Troponin I, High Sensitivity 78 (H) 0 - 53 ng/L   B-Type Natriuretic Peptide   Result Value Ref Range    BNP 2,725 (H) 0 - 99 pg/mL   Phosphorus   Result Value Ref Range    Phosphorus 6.2 (H) 2.5 - 4.9 mg/dL   Magnesium   Result Value Ref Range    Magnesium 1.83 1.60 - 2.40 mg/dL   Blood Gas Venous Full Panel Unsolicited   Result Value Ref Range    POCT pH, Venous 7.46 (H) 7.33 - 7.43 pH    POCT pCO2, Venous 47 41 - 51 mm Hg    POCT pO2, Venous 65 (H) 35 - 45 mm Hg    POCT SO2, Venous 91 (H) 45 - 75 %    POCT Oxy Hemoglobin, Venous 88.4 (H) 45.0 - 75.0 %    POCT Hematocrit Calculated, Venous 33.0 (L) 41.0 - 52.0 %    POCT Sodium, Venous 139 136 - 145 mmol/L    POCT Potassium, Venous 4.7 3.5 - 5.3 mmol/L    POCT Chloride, Venous 100 98 - 107 mmol/L    POCT Ionized Calicum, Venous 1.09 (L) 1.10 - 1.33 mmol/L    POCT Glucose, Venous 75 74 - 99 mg/dL    POCT Lactate, Venous 0.5 0.4 - 2.0 mmol/L    POCT Base Excess, Venous 8.5 (H) -2.0 - 3.0 mmol/L    POCT HCO3 Calculated, Venous 33.4 (H) 22.0 - 26.0 mmol/L    POCT Hemoglobin, Venous 10.9 (L) 13.5 - 17.5 g/dL    POCT Anion Gap, Venous 10.0 10.0 - 25.0 mmol/L    Patient Temperature 37.0 degrees Celsius   Troponin I, High Sensitivity   Result Value Ref Range    Troponin I, High Sensitivity 73 (H) 0 - 53 ng/L   Sars-CoV-2 PCR, Symptomatic   Result Value Ref Range    Coronavirus 2019, PCR Not Detected Not Detected   Influenza A, and B PCR   Result Value Ref Range    Flu A Result Not Detected Not Detected    Flu B Result Not Detected Not Detected   ECG 12 lead   Result Value Ref Range    Ventricular Rate 74 BPM    Atrial Rate 74 BPM    AR Interval 190 ms    QRS Duration 104 ms    QT Interval 456 ms    QTC Calculation(Bazett) 506 ms    P Axis 74 degrees    R Axis 106 degrees    T Axis 12 degrees    QRS Count 12 beats    Q Onset 213 ms    P Onset 118 ms     P Offset 172 ms    T Offset 441 ms    QTC Fredericia 489 ms     IMG  Lower extremity venous duplex right  Result Date: 11/27/2023  1. Chronic appearing clot in the mid to distal right femoral vein with intact venous flow. 2. Remainder of the evaluated veins in the right lower extremity demonstrate no evidence of deep vein thrombosis. 3. 4.8 cm complex fluid collection in the right popliteal fossa is indeterminate but likely represents a Baker's cyst.    XR chest 1 view  Result Date: 11/26/2023  1.  Findings suspicious for pulmonary edema and small bilateral pleural effusions.       A/P  Mr. Marcelino Connors is a 61 yo M w/ PMH most pertinent for anuric ESRD 2/2 hypertensive nephropathy now on iHD [facility] ([facility]) via L UE AVF, HTN c/b multiple presentations for urgency/emergency, and HFpEF (EF 60-65% 3/8/2022) likely 2/2 chronic HTN. Presented to [facility] ED (11/26) w/ CC acute dyspnea. Found to have acute hypoxic respiratory failure (no O2 at baseline) likely 2/2 pulmonary edema (per CXR) d/t hypertensive emergency (/120), volume overload (ESRD, increased salt intake over holiday). BP improving w/ adding home BP meds and nitroglycerin ggt, goal 25% MAP reduction in first 6 hours. S/p CPAP in ED, now on LFNC, continuing to wean to room air. Nephrology planning for urgent iHD w/ UF goal 3-4 L as tolerated.    NEURO  -no active concerns    PULM  Acute hypoxic respiratory failure  -not on baseline O2 at home  -likely 2/2 pulmonary edema d/t hypertensive urgency and hypervolemia (s/o ESRD)  -s/p CPAP in ED  -now on LFNC, wean as tolerated (back to baseline no O2)    CV  Hypertensive emergency  -/120 on arrival, no neuro deficits, troponin peaked at 79, LFTs WNL, however w/ acute hypoxic respiratory failure 2/2 pulmonary edema  -likely related to ESRD, likely acute overload d/t salty diet over Thanksgiving  -MAP goal reduce 25% in 6 hours then SBP ~160 by 24 hours  -continue hydralazine 100mg PO  TID (4 AM)  -continue losartan 100mg PO daily (due 9 AM)  -continue nifedipine 60mg PO daily (due 9 AM)  -continue labetalol 200mg PO q12h (4 AM)  -continue clonidine 0.1mg/24h patch daily (due for next dose 11/28)  -nephrology ordered iUF net    HFpEF (EF 60-65% 3/8/2022) likely 2/2 chronic HTN  -BNP 2725 (baseline 1,629) in s/o BMI 21  -UF as below for volume removal  -pt counseled on low Na diet    Primary prevention aspirin?  -continue aspirin 81mg PO daily    HLD  -continue atorvastatin 40mg PO daily    GI  -no active concerns    RENAL  Anuric ESRD 2/2 hypertensive nephropathy now on iHD [facility] ([facility]) via L UE AVF  -per pt last iHD 11/24, thinks removed 2 L  -denies change in diet over holiday, not able to name meds but says takes 8 pills a day including some 3x a day and some 2x a day  -urgent iHD indicated for hypervolemia c/b HTN emergency given acute hypoxic respiratory failure 2/2 pulmonary edema (requiring CPAP in ED)  -nephrology consulted to ED, plan for 2.5 hour iHD session w/ target UF 3-4L as tolerated      -no active concerns    ENDO  -no active concerns    HEME/ONC  Chronic anemia likely 2/2 ESRD  -recent baseline Hb 10-11, at baseline  -not sure if getting EPO at iHD sessions  -will check anemia labs (Fe panel, ferritin, B12, folate), reasonable to consider Fe supplement if labs c/w microcytic anemia (especially if pt starts EPO)    Chronic non-occlusive clot of R femoral vein  -identified on DVT US in ED (11/27)  -given chronic, will not start therapeutic AC  -pharm DVT ppx for now    ID  -no active concerns    PSYCH/SOC  -no active concerns    Disposition  -barriers: urgent iHD, nitro ggt  -destination: floor  -f/u: PCP, nephrology    Diet: renal  Electrolytes: K >4; Mg >2  DVT ppx: subcutaneous heparin (ESRD)  GI ppx: not indicated  Lines/tubes: PIV, L UE AVF  O2: LFNC 2 L/min  ggt: none  Restraints: none  Baseline Cr: ESRD  T+S: not indicated  Code status:  full  Surrogate decision maker: Luna Robles (adult son) [phone]      Cole KURTZ, PGY2

## 2023-11-27 NOTE — PROGRESS NOTES
Pharmacy Medication History Review    Marcelino Connors is a 62 y.o. male admitted for Hypertensive emergency. Pharmacy reviewed the patient's heoyu-pl-fzazbytye medications and allergies for accuracy.    The list below reflects the updated PTA list. Comments regarding how patient may be taking medications differently can be found in the Admit Orders Activity  Prior to Admission Medications   Prescriptions Last Dose Informant Patient Reported?   B complex-vitamin C-folic acid (Nephrocaps) 1 mg capsule   No   Sig: TAKE 1 CAPSULE BY MOUTH ONCE DAILY   NIFEdipine ER (Adalat CC) 60 mg 24 hr tablet   No   Sig: TAKE 1 TABLET BY MOUTH ONCE DAILY   aspirin 81 mg chewable tablet   No   Sig: CHEW 1 TABLET BY MOUTH ONCE DAILY   atorvastatin (Lipitor) 40 mg tablet   No   Sig: TAKE 1 TABLET BY MOUTH ONCE DAILY   cloNIDine (Catapres-TTS) 0.1 mg/24 hr patch Past Week at 0900  No   Sig: APPLY 1 PATCH TRANSDERMAL ONCE WEEKLY ON TUESDAY.   hydrALAZINE (Apresoline) 100 mg tablet   No   Sig: TAKE 1 TABLET BY MOUTH EVERY 8 HOURS   hydrALAZINE (Apresoline) 100 mg tablet   No   Sig: TAKE 1 TABLET BY MOUTH EVERY 8 HOURS   isosorbide mononitrate ER (Imdur) 120 mg 24 hr tablet   No   Sig: TAKE 1 TABLET BY MOUTH ONCE DAILY.   labetalol (Normodyne) 200 mg tablet   No   Sig: TAKE 1 TABLET BY MOUTH EVERY 12 HOURS   losartan (Cozaar) 100 mg tablet   No   Sig: TAKE 1 TABLET BY MOUTH ONCE DAILY   losartan (Cozaar) 100 mg tablet   No   Sig: TAKE 1 TABLET BY MOUTH ONCE DAILY      Facility-Administered Medications: None        The list below reflects the updated allergy list. Please review each documented allergy for additional clarification and justification.  Allergies  Reviewed by Christie Brambila RN on 11/27/2023   No Known Allergies         Patient accepts M2B at discharge. Pharmacy has been updated to [facility].    Sources used to complete the med history include out patient fill history, OARRS, and patient interview. Patient was a good  historian, was able to confirm and update his med list.      Below are additional concerns with the patient's PTA list.  -Patient reported taking labetalol 200 mg differently, patient takes it once daily instead of every 12 hours.  - Patient has ran out of few medications prior to admission so he has not been actively taking (Aspirin 81mg. And Hal Caps)    Amanda Rico PharmD  Transitions of Care Pharmacist  [facility]  Please reach out via Secure Chat for questions, or if no response call SOAK ([employer]) or [facility]era [facility]

## 2023-11-27 NOTE — ED TRIAGE NOTES
Reports SOB that started today, due for HD tomorrow. Pt states he feels fluid overloaded. Has edema in bilateral lower extremities. States he is compliant with meds.

## 2023-11-27 NOTE — NURSING NOTE
Report from Sending RN:    Report From: Deepthi Lewis RN)  Recent Surgery of Procedure: No  Baseline Level of Consciousness (LOC): A/Ox 4  Oxygen Use: No  Type: none  Diabetic: No  Last BP Med Given Day of Dialysis: hydralazine 100 mg 1248 pm;   Last Pain Med Given: none  Lab Tests to be Obtained with Dialysis: No  Blood Transfusion to be Given During Dialysis: No  Available IV Access: Yes  Medications to be Administered During Dialysis: No  Continuous IV Infusion Running: No  Restraints on Currently or in the Last 24 Hours: No  Hand-Off Communication: No acute overnight or morning events; vss; Pt did take medications; Pt  may go off unit without telemetry; Pt will need labs; Pt is a full code; Mary Kay Hernandez RN.

## 2023-11-27 NOTE — CONSULTS
"NEPHROLOGY NEW CONSULT NOTE   Marcelino Connors   62 y.o.    @WT@  MRN/Room: [id number]/20/20-A    Reason for consult: ESRD    HPI:  Mr. Connors is a 61 yo M w/ PMH pertinent for anuric ESRD 2/2 hypertensive nephropathy HD MWF ([facility]) via LUE AVF, HTN c/b multiple presentations for urgency/emergency, and HFpEF (EF 60-65% 3/8/2022) likely 2/2 chronic HTN. Presented to [facility] ED (11/26) for shortness of breath. Per patient he went to HD Friday, does not know how much fluid was removed.     Found to have acute hypoxic respiratory failure requiring bipap. CXR showing pulmonary edema. He was hypertensive, with sBP 240s so he was placed on nitroglycerin gtt overnight which has been discontinued. States he is compliant with his BP meds. Did not miss HD last week.     Pt gets HD at [facility]. LUE AVF. Anuric. DW 60kg.       In The ER: BP (!) 196/90 Comment: MD aware of BP  Pulse 76   Temp 37 °C (98.6 °F) (Temporal)   Resp 24   Ht 1.676 m (5' 6\")   Wt 59.1 kg (130 lb 4.7 oz)   SpO2 96%   BMI 21.03 kg/m²      History reviewed. No pertinent past medical history.   Past Surgical History:   Procedure Laterality Date    CT ABDOMEN PELVIS ANGIOGRAM W AND/OR WO IV CONTRAST  9/25/2022    CT ABDOMEN PELVIS ANGIOGRAM W AND/OR WO IV CONTRAST 9/25/2022 [facility]ICAL LEGACY      No family history on file.  Social History     Socioeconomic History    Marital status: Single     Spouse name: Not on file    Number of children: Not on file    Years of education: Not on file    Highest education level: Not on file   Occupational History    Not on file   Tobacco Use    Smoking status: Former     Types: Cigarettes    Smokeless tobacco: Never   Substance and Sexual Activity    Alcohol use: Not on file    Drug use: Not on file    Sexual activity: Not on file   Other Topics Concern    Not on file   Social History Narrative    Not on file     Social Determinants of Health     Financial Resource Strain: Low Risk  (11/27/2023)    " Overall Financial Resource Strain (CARDIA)     Difficulty of Paying Living Expenses: Not hard at all   Food Insecurity: Not on file   Transportation Needs: No Transportation Needs (11/27/2023)    PRAPARE - Transportation     Lack of Transportation (Medical): No     Lack of Transportation (Non-Medical): No   Physical Activity: Not on file   Stress: Not on file   Social Connections: Not on file   Intimate Partner Violence: Not on file   Housing Stability: Low Risk  (11/27/2023)    Housing Stability Vital Sign     Unable to Pay for Housing in the Last Year: No     Number of Places Lived in the Last Year: 1     Unstable Housing in the Last Year: No       No Known Allergies     Medications Prior to Admission   Medication Sig Dispense Refill Last Dose    aspirin 81 mg chewable tablet CHEW 1 TABLET BY MOUTH ONCE DAILY 30 tablet 2     atorvastatin (Lipitor) 40 mg tablet TAKE 1 TABLET BY MOUTH ONCE DAILY 30 tablet 1     B complex-vitamin C-folic acid (Nephrocaps) 1 mg capsule TAKE 1 CAPSULE BY MOUTH ONCE DAILY 30 capsule 1     cloNIDine (Catapres-TTS) 0.1 mg/24 hr patch APPLY 1 PATCH TRANSDERMAL ONCE WEEKLY ON TUESDAY. 4 patch 2 Past Week at 0900    hydrALAZINE (Apresoline) 100 mg tablet TAKE 1 TABLET BY MOUTH EVERY 8 HOURS 90 tablet 2     hydrALAZINE (Apresoline) 100 mg tablet TAKE 1 TABLET BY MOUTH EVERY 8 HOURS 90 tablet 2     isosorbide mononitrate ER (Imdur) 120 mg 24 hr tablet TAKE 1 TABLET BY MOUTH ONCE DAILY. 30 tablet 2     labetalol (Normodyne) 200 mg tablet TAKE 1 TABLET BY MOUTH EVERY 12 HOURS 60 tablet 2     losartan (Cozaar) 100 mg tablet TAKE 1 TABLET BY MOUTH ONCE DAILY 30 tablet 2     losartan (Cozaar) 100 mg tablet TAKE 1 TABLET BY MOUTH ONCE DAILY 30 tablet 1     NIFEdipine ER (Adalat CC) 60 mg 24 hr tablet TAKE 1 TABLET BY MOUTH ONCE DAILY 30 tablet 2         Meds:   aspirin, 81 mg, Daily  atorvastatin, 40 mg, Daily  B complex-vitamin C-folic acid, 1 capsule, Daily  [START ON 11/28/2023] cloNIDine, 1  patch, Weekly  heparin (porcine), 5,000 Units, q8h  hydrALAZINE, 100 mg, q8h  labetalol, 1 tablet, q12h  losartan, 100 mg, Daily  NIFEdipine ER, 60 mg, Daily         oxygen, , Continuous PRN - O2/gases        Vitals:    11/27/23 1400   BP: (!) 196/90   Pulse: 76   Resp: 24   Temp:    SpO2: 96%        11/25 1900 - 11/27 0659  In: 1283.7 [I.V.:883.7]  Out: 3000    Weight change:      General appearance: AAOx3. No distress  Eyes: non-icteric  Skin: no apparent rash  Heart: RRR  Lungs: CTA bilat.    Abdomen: soft, nt/nd  Extremities: No edema bilat  Neuro: No FND  ACCESS: LUE AVF      ASSESSMENT:    Patient is a 61yo M with pmhx of ESRD MWF, CHFpEF, HTN who presented to the ED for shortness of breath. Per report he last had HD Friday.            #ESRD MWF via LUE AVF  #AHRF  #HTN emergency  -last HD Friday 11/24, access: LUE AVF, HD at [facility], DW 60kg  -electrolytes stable, s/p HD overnight with 3L UF    #HTN  -Clonidine patch 0.1mg weekly, hydralazine 100mg TID, labetalol 200mg BID, Losartan 100mg daily, Nifedipine 60mg daily     #Anemia  -Hb stable, 10.9    Recommendations:  - plan for HD today to return patient to his MWF schedule and for further fluid removal  - will continue to follow for dialysis needs     Godwin Last DO  Nephrology Fellow  24 hour Renal Pager - [phone]    Discussed with attending nephrologist

## 2023-11-27 NOTE — PROGRESS NOTES
ICU to Michelle Transfer Summary     I:  ICU Admission Reason & Brief ICU Course:    Mr. Connors is a 63 yo M w/ PMH pertinent for anuric ESRD 2/2 hypertensive nephropathy HD MWF ([facility]) via L UE AVF, HTN c/b multiple presentations for urgency/emergency, and HFpEF (EF 60-65% 3/8/2022) likely 2/2 chronic HTN. Presented to [facility] ED (11/26) w/ CC acute dyspnea. Found to have acute hypoxic respiratory failure (no O2 at baseline) likely 2/2 pulmonary edema (per CXR) d/t hypertensive emergency (/120), volume overload (ESRD, increased salt intake over holiday). BP improving w/ adding home BP meds and nitroglycerin ggt, goal 25% MAP reduction in first 6 hours. S/p CPAP in ED, now on LFNC, continuing to wean to room air. S/p iHD overnight, off drips. Stable for floor       C: Code Status/DPOA Info/Goals of Care/ACP Note    Full Code  DPOA/Contact Number: Erica Shelton ([facility]) [phone]     U: Unprescribing & Pertinent High-Risk Medications    Changes to home meds: None     Anticoagulation: Yes - [facility]    Antibiotics:   [x] N/A - no current planned antimicrobioals    P: Pending Tests at the Time of Transfer   None       A: Active consultants, including Rehab:   []  Subspecialty Consultants: nephrology  []  PT  []  OT  []  SLP  []  Wound Care    U: Uncertainty Measure/Diagnostic Pause:    Working diagnosis at the time of transfer Acute hypoxic respiratory failure 2/2 pulmonary edema d/t hypertensive urgency and hypervolemia       Diagnosis Degree of Certainty: 1. High degree of certainty about the clinical diagnosis.     S: Summary of Major Problems and To-Dos:   A/P  Mr. Marcelino Connors is a 63 yo M w/ PMH of anuric ESRD 2/2 hypertensive nephropathy now (on HD MWF), HTN c/b multiple presentations for urgency/emergency, and HFpEF (EF 60-65% 3/8/2022) 2/2 chronic HTN presenting with acute dyspnea. ED course was notable for SBP 200s and CXR significant for pulmonary edema. He was initially placed on 2L NC due to  increased work of breathing and transitioned to bipap. He received Hydralazine IV and nitroglycerin GGT for BP management. Clinical presentation and patient evaluation were concerning for acute hypoxic respiratory failure likely 2/2 pulmonary edema (per CXR) d/t hypertensive emergency (/120), and volume overload (ESRD, increased salt intake over holiday).  Nephrology was consulted and patient underwent urgent HD (now s/p 3L removed) BP has since improved and nitroglycerin GGT has since been dc. His respiratory function has since returned to baseline and home BP meds were resumed.     Updates 11/27:   - transfer to floor   - home BP meds resumed, nitroglycerin  drip dc  - On RA     To do:  -Follow up with nephrology recs to determine next dialysis session        NEURO  -no active concerns     PULM  #Acute hypoxic respiratory failure, resolved   -not on baseline O2 at home  -was likely 2/2 pulmonary edema d/t hypertensive urgency and hypervolemia (s/o ESRD)  -s/p CPAP in ED  -now on RA     CV  #Hypertensive emergency, improved   -/89 this AM, was 240/120 on arrival, no neuro deficits, troponin peaked at 79, LFTs WNL, however w/ acute hypoxic respiratory failure 2/2 pulmonary edema  - s/p urgent HD (~3L removed)   - Nitro GGT has since been dc   - MAP goal reduce 25% in 6 hours then SBP ~160 by 24 hours  -Home meds:     -continue hydralazine 100mg PO TID (4 AM)    -continue losartan 100mg PO daily (due 9 AM)    -continue nifedipine 60mg PO daily (due 9 AM)    -continue labetalol 200mg PO q12h (4 AM)    -continue clonidine 0.1mg/24h patch daily (due for next dose 11/28)     #HFpEF (EF 60-65% 3/8/2022) likely 2/2 chronic HTN  -BNP 2725 (baseline 1,629) in s/o BMI 21  -pt counseled on low Na diet    #HLD  -continue atorvastatin 40mg PO daily     GI  -no active concerns     RENAL  #Anuric ESRD 2/2 hypertensive nephropathy   -HD MWF ([facility]) via L UE AVF     -per pt last iHD 11/24, thinks  removed 2 L  -s/p urgent HD (`3L removed) indicated for hypervolemia c/b HTN emergency given acute hypoxic respiratory failure 2/2 pulmonary edema (requiring CPAP in ED)  -nephrology following       -no active concerns     ENDO  -no active concerns     HEME/ONC  #Chronic anemia likely 2/2 ESRD  -At baseline,baseline Hb 10-11     #Chronic non-occlusive clot of R femoral vein  -identified on DVT US in ED (11/27)  -given chronic, will not start therapeutic AC  -pharm DVT ppx for now     ID  -no active concerns     PSYCH/SOC  -no active concerns     Disposition  -transfer to floor     Diet: renal  Electrolytes: K >4; Mg >2  DVT ppx: subcutaneous heparin (ESRD)  GI ppx: not indicated  Lines/tubes: PIV, L UE AVF  O2: LFNC 2 L/min  ggt: none  Restraints: none  Baseline Cr: ESRD  T+S: not indicated  Code status: FULL CODE   Surrogate decision maker: Erica Shelton (POA) [phone]     The patient was seen, evaluated, and discussed with Dr. uTng Ferrer MD   PGY-1 Internal Medicine     To-do list prior to transfer:  []Fu nephro recs      E: Exam, including Lines/Drains/Airways & Data Review:   Lines/tubes: PIV, L UE AVF   Difficult airway? N/A  Lines/drains assessed for removal? No    Physical Exam:   General: NAD, L UE AVF w/ audible bruit  Cardio: RRR, no rubs/murmurs, no S3/S4  Pulm: non-labored, no accessory muscle usage, no crackles/wheezing/stridor  GI: non-distended, appropriately soft, no masses, non-tender  : no CVA tenderness  MSK: no joint swelling, grossly full active ROM  Extremities: no cyanosis, distal extremities warm, no bilat LE edema  HEENT: grossly normocephalic  Neuro: grossly oriented, CN grossly intact, extremities moving symmetrically  Psych: appropriate affect      Within 30 minutes of the patient physically leaving the floor, a Floor Readiness Note needs to be placed with updated vitals.

## 2023-11-27 NOTE — ED PROVIDER NOTES
HPI:      Limitations to History: none   Additional History Obtained from: none   External records reviewed: prior admission note     Chief Complaint   Patient presents with    Shortness of Breath          Marcelino Connors is a 62 y.o. male with past medical history of ESRD on HD MWF via LUE AVF, HTN, HLD, HFpEF (EF 60% 11/2022), and CAD presenting to the ED with shortness of breath and chest pain that has gotten progressively worse over the last few days.  Patient states that he has been to dialysis every day, has not missed any sessions, unsure how much fluid they took off at his last dialysis session.  Patient does admit that he has not been taking one of his blood pressure medications over the last few days, because he ran out.    Patient was recently admitted on 9/18/2023 through 9/21/2023 for hypertensive emergency, after he missed a dialysis session, and had been out of his blood pressure medication for roughly 3 weeks.  On this admission, he did not require ICU or BiPAP.      No past medical history on file.  Past Surgical History:   Procedure Laterality Date    CT ABDOMEN PELVIS ANGIOGRAM W AND/OR WO IV CONTRAST  9/25/2022    CT ABDOMEN PELVIS ANGIOGRAM W AND/OR WO IV CONTRAST 9/25/2022 [facility]        Not on File      --------------------------------------------------------------------------------------------------------------------------------------    VS: As documented in the triage note and EMR flowsheet from this visit were reviewed.  Temp 37.1 °C (98.8 °F) HR 77 BP (!) 240/120 RR 20 Sat 92 % on      Physical Exam:  GEN: Mild respiratory distress, appears uncomfortable, somewhat sleepy on examination. Conversational and appropriate.    HEENT: Normocephalic, atraumatic. Conjunctiva pink with no redness or exudates. Hearing grossly intact. Moist mucous membranes. Left canine tooth broken without any bleeding.  Poor dental hygiene.  CARDIO: Normal rate and regular rhythm. Normal S1, S2  without  murmurs, rubs, or gallops.   PULM: Clear to auscultation bilaterally. No rales, rhonchi, or wheezes. No accessory muscle use or stridor. Speaking in full sentences.  GI: Soft, non-tender, non-distended. No rebound tenderness or guarding.   SKIN: Warm and dry, no rashes, lesions, petechiae, or purpura.  MSK: ROM intact in all 4 extremities without contractures or pain.  2+ peripheral pitting edema, with tenderness to palpation of the right lower extremity.  No Contusions, or wounds.    NEURO: A&Ox3, No focal findings identified. No confusion or gross mental status changes.  PSYCH: Appropriate mood and behavior, converses and responds appropriately during exam.        ---------------------------------------------------------------------------------------------------------------------------------------    Given in the ED:   Medications   aspirin chewable tablet  - Omnicell Override Pull (  Not Given 11/26/23 2135)   nitroglycerin (Tridil) 50 mg in dextrose 5% 250 mL (0.2 mg/mL) infusion (premix) (25 mcg/min intravenous Rate/Dose Change 11/27/23 0000)   oxygen (O2) therapy (30 percent inhalation Start 11/26/23 2250)   acetaminophen (Tylenol) tablet 975 mg (975 mg oral Given 11/26/23 2123)   hydrALAZINE (Apresoline) injection 5 mg (5 mg intravenous Given 11/26/23 2123)   aspirin tablet 325 mg (325 mg oral Given 11/26/23 2123)   nitroglycerin (Nitrostat) SL tablet 0.4 mg (0.4 mg sublingual Given 11/26/23 2129)   hydrALAZINE (Apresoline) injection 10 mg (10 mg intravenous Given 11/26/23 2224)   nitroglycerin (Nitrostat) SL tablet 0.4 mg (0.4 mg sublingual Given 11/26/23 2310)          Work up: All labs and imaging were independently reviewed by me.    Labs Reviewed   CBC WITH AUTO DIFFERENTIAL - Abnormal       Result Value    WBC 6.0      nRBC 0.0      RBC 3.59 (*)     Hemoglobin 10.6 (*)     Hematocrit 32.6 (*)     MCV 91      MCH 29.5      MCHC 32.5      RDW 15.9 (*)     Platelets 158      Neutrophils % 56.5       Immature Granulocytes %, Automated 0.2      Lymphocytes % 20.8      Monocytes % 12.6      Eosinophils % 8.6      Basophils % 1.3      Neutrophils Absolute 3.40      Immature Granulocytes Absolute, Automated 0.01      Lymphocytes Absolute 1.25      Monocytes Absolute 0.76      Eosinophils Absolute 0.52      Basophils Absolute 0.08     COMPREHENSIVE METABOLIC PANEL - Abnormal    Glucose 86      Sodium 143      Potassium 4.6      Chloride 97 (*)     Bicarbonate 33 (*)     Anion Gap 18      Urea Nitrogen 47 (*)     Creatinine 12.46 (*)     eGFR 4 (*)     Calcium 9.2      Albumin 3.7      Alkaline Phosphatase 82      Total Protein 7.1      AST 22      Bilirubin, Total 0.5      ALT 12     TROPONIN I, HIGH SENSITIVITY - Abnormal    Troponin I, High Sensitivity 78 (*)     Narrative:     Less than 99th percentile of normal range cutoff-  Female and children under 18 years old <35 ng/L; Male <54 ng/L: Negative  Repeat testing should be performed if clinically indicated.     Female and children under 18 years old  ng/L; Male  ng/L:  Consistent with possible cardiac damage and possible increased clinical   risk. Serial measurements may help to assess extent of myocardial damage.     >120 ng/L: Consistent with cardiac damage, increased clinical risk and  myocardial infarction. Serial measurements may help assess extent of   myocardial damage.      NOTE: Children less than 1 year old may have higher baseline troponin   levels and results should be interpreted in conjunction with the overall   clinical context.    NOTE: Troponin I testing is performed using a different   testing methodology at [facility] [place] [facility] than at other   [facility] hospitals. Direct result comparisons should only   be made within the same method.     B-TYPE NATRIURETIC PEPTIDE - Abnormal    BNP 2,725 (*)     Narrative:        <100 pg/mL - Heart failure unlikely  100-299 pg/mL - Intermediate probability of acute heart                  failure  exacerbation. Correlate with clinical                  context and patient history.    >=300 pg/mL - Heart Failure likely. Correlate with clinical                  context and patient history.     Biotin interference may cause falsely decreased results. Patients taking a Biotin dose of up to 5 mg/day should refrain from taking Biotin for 24 hours before sample  collection. Providers may contact their local laboratory for further information.   PHOSPHORUS - Abnormal    Phosphorus 6.2 (*)    TROPONIN I, HIGH SENSITIVITY - Abnormal    Troponin I, High Sensitivity 73 (*)     Narrative:     Less than 99th percentile of normal range cutoff-  Female and children under 18 years old <35 ng/L; Male <54 ng/L: Negative  Repeat testing should be performed if clinically indicated.     Female and children under 18 years old  ng/L; Male  ng/L:  Consistent with possible cardiac damage and possible increased clinical   risk. Serial measurements may help to assess extent of myocardial damage.     >120 ng/L: Consistent with cardiac damage, increased clinical risk and  myocardial infarction. Serial measurements may help assess extent of   myocardial damage.      NOTE: Children less than 1 year old may have higher baseline troponin   levels and results should be interpreted in conjunction with the overall   clinical context.    NOTE: Troponin I testing is performed using a different   testing methodology at [facility] than at other   [facility]. Direct result comparisons should only   be made within the same method.     BLOOD GAS VENOUS FULL PANEL UNSOLICITED - Abnormal    POCT pH, Venous 7.46 (*)     POCT pCO2, Venous 47      POCT pO2, Venous 65 (*)     POCT SO2, Venous 91 (*)     POCT Oxy Hemoglobin, Venous 88.4 (*)     POCT Hematocrit Calculated, Venous 33.0 (*)     POCT Sodium, Venous 139      POCT Potassium, Venous 4.7      POCT Chloride, Venous 100      POCT Ionized Calicum, Venous 1.09 (*)     POCT  Glucose, Venous 75      POCT Lactate, Venous 0.5      POCT Base Excess, Venous 8.5 (*)     POCT HCO3 Calculated, Venous 33.4 (*)     POCT Hemoglobin, Venous 10.9 (*)     POCT Anion Gap, Venous 10.0      Patient Temperature 37.0     MAGNESIUM - Normal    Magnesium 1.83     SARS-COV-2 PCR, SYMPTOMATIC - Normal    Coronavirus 2019, PCR Not Detected      Narrative:     This assay has received FDA Emergency Use Authorization (EUA) and is only authorized for the duration of time that circumstances exist to justify the authorization of the emergency use of in vitro diagnostic tests for the detection of SARS-CoV-2 virus and/or diagnosis of COVID-19 infection under section 564(b)(1) of the Act, 21 U.S.C. 360bbb-3(b)(1). This assay is an in vitro diagnostic nucleic acid amplification test for the qualitative detection of SARS-CoV-2 from nasopharyngeal specimens and has been validated for use at [facility]. Negative results do not preclude COVID-19 infections and should not be used as the sole basis for diagnosis, treatment, or other management decisions.     INFLUENZA A AND B PCR - Normal    Flu A Result Not Detected      Flu B Result Not Detected      Narrative:     This assay is an in vitro diagnostic multiplex nucleic acid amplification test for the detection and discrimination of Influenza A & B from nasopharyngeal specimens, and has been validated for use at [facility]. Negative results do not preclude Influenza A/B infections, and should not be used as the sole basis for diagnosis, treatment, or other management decisions. If Influenza A/B and RSV PCR results are negative, testing for Parainfluenza virus, Adenovirus and Metapneumovirus is routinely performed for [facility] pediatric oncology and intensive care inpatients, and is available on other patients by placing an add-on request.   BLOOD GAS VENOUS FULL PANEL       XR chest 1 view   Final Result   1.  Findings suspicious  for pulmonary edema and small bilateral   pleural effusions.        I personally reviewed the images/study and I agree with the findings   as stated by Abimael Barros MD (resident) . This study was   interpreted at [facility],   [place], [place].        MACRO:   None        Signed by: Tereso Luna 11/26/2023 9:47 PM   Dictation workstation:   [id number]      Point of Care Ultrasound    (Results Pending)   Point of Care Ultrasound    (Results Pending)   Lower extremity venous duplex right    (Results Pending)       Medications   aspirin chewable tablet  - Omnicell Override Pull (  Not Given 11/26/23 2135)   nitroglycerin (Tridil) 50 mg in dextrose 5% 250 mL (0.2 mg/mL) infusion (premix) (25 mcg/min intravenous Rate/Dose Change 11/27/23 0000)   oxygen (O2) therapy (30 percent inhalation Start 11/26/23 2250)   acetaminophen (Tylenol) tablet 975 mg (975 mg oral Given 11/26/23 2123)   hydrALAZINE (Apresoline) injection 5 mg (5 mg intravenous Given 11/26/23 2123)   aspirin tablet 325 mg (325 mg oral Given 11/26/23 2123)   nitroglycerin (Nitrostat) SL tablet 0.4 mg (0.4 mg sublingual Given 11/26/23 2129)   hydrALAZINE (Apresoline) injection 10 mg (10 mg intravenous Given 11/26/23 2224)   nitroglycerin (Nitrostat) SL tablet 0.4 mg (0.4 mg sublingual Given 11/26/23 2310)       ED Course as of 11/27/23 0006   Sun Nov 26, 2023 2130 Arrived with CP and sob, on 2L nc satting 97%, 95% on RA with some increased WOB.  [AD]   2145 VBG showing mixed alkalosis.  [AD]   2230 Discussed with nephrology, planning for dialysis [AD]   2230 POCUS showing good squeeze, no pericardial effusions, no overt HFrEF. B-lines throughout bilateral lung fields [AD]   2245 BP noted to be 240/120, given 5mg hydralazine and 0.4 nitroglycerin sublingual with minimal improvement of his blood pressure.  Given an additional 10 mg hydralazine and an additional nitroglycerin sublingual with blood pressure now  219/117.  [AD]   2256 HEMOGLOBIN(!): 10.6 [AD]   2257 Blood Urea Nitrogen(!): 47 [AD]   2257 Creatinine(!): 12.46 [AD]   2257 BNP(!): 2,725 [AD]   2257 Troponin I, High Sensitivity(!): 73 [AD]   2257 MAGNESIUM: 1.83 [AD]   2258 PHOSPHORUS(!): 6.2 [AD]   2258 Given patient's continued elevated blood pressure >200/>120, chest x-ray showing pulmonary edema, discussed placing on BiPAP, however patient was unable to tolerate.  [AD]   Mon Nov 27, 2023   0002 Started on nitroglycerin drip, at 5mcg we will continue to titrate. [AD]      ED Course User Index  [AD] Tamika Espinal,          Diagnoses as of 11/27/23 0006   Hypertensive emergency   Shortness of breath       Consults: Nephrology made aware of patient, will likely need dialysis today or tomorrow.  Scheduled for hemodialysis Monday Wednesday Friday.    MDM:  Briefly, this is a this is a 62-year-old male with past medical history of ESRD on HD MWF via LUE AVF, HTN, HLD, HFpEF (EF 60% 11/2022), and CAD presenting to the ED with shortness of breath and chest pain that has gotten progressively worse over the last few days.  On arrival, he is significantly short of breath, placed on nasal cannula and was maintaining saturations above 95%, however was significantly hypertensive to 240/120.  VBG was obtained showing mixed alkalosis, with increased respiratory rate and significant respiratory effort.  Given nitroglycerin, and hydralazine 5 mg with minimal improvement of his blood pressure.  Given an additional dose of hydralazine 10 mg with minimal improvement  Of blood pressure.  Decision was made to place patient on BiPAP with the nitroglycerin drip, given fluid overload, chest x-ray showing findings of pulmonary edema in addition to lower extremity, and inability to perform urgent diuresis given history of end-stage renal disease.  Overall lab work was remarkable for elevated troponin in the setting of hypertensive emergency.  At this time, was discussed with MICU  attending, Dr. Cronin, who accepted the patient for further management.     Impression:   1. Hypertensive emergency    2. Shortness of breath        Social Determinants of Health: On further discussion, patient does admit to some transportation difficulties with obtaining his medications, notes that he has been out of one of his blood pressure medications for the last few days, but is unable to tell me which.    Plan and Disposition: Admission to MICU for further management of pulmonary edema, fluid overload, and hypertensive emergency            Tamika Tolliver DO  Emergency Medicine, PGY-2      Patient was seen and evaluated by the attending physician. The attending ED physician agrees with the plan. Patient and/or patient´s representative was counseled regarding labs, imaging, likely diagnosis, and plan. All questions were answered.    Disclaimer: This note was dictated by speech recognition.  Attempt at proofreading was made to minimize errors.  Errors in transcription may be present.  Please call if questions.     Tamika Tolliver DO  Resident  11/29/23 0042

## 2023-11-27 NOTE — CARE PLAN
The patient's goals for the shift include      The clinical goals for the shift include SBP <180    Over the shift, the patient did not make progress toward the following goals. Barriers to progression include needing dialysis. Recommendations to address these barriers include have patient get another HD session  Problem: Pain - Adult  Goal: Verbalizes/displays adequate comfort level or baseline comfort level  Outcome: Progressing     Problem: Safety - Adult  Goal: Free from fall injury  Outcome: Progressing     Problem: Discharge Planning  Goal: Discharge to home or other facility with appropriate resources  Outcome: Progressing     Problem: Chronic Conditions and Co-morbidities  Goal: Patient's chronic conditions and co-morbidity symptoms are monitored and maintained or improved  Outcome: Progressing     Problem: Pain - Adult  Goal: Verbalizes/displays adequate comfort level or baseline comfort level  Outcome: Progressing     Problem: Safety - Adult  Goal: Free from fall injury  Outcome: Progressing     Problem: Discharge Planning  Goal: Discharge to home or other facility with appropriate resources  Outcome: Progressing     Problem: Chronic Conditions and Co-morbidities  Goal: Patient's chronic conditions and co-morbidity symptoms are monitored and maintained or improved  Outcome: Progressing   .

## 2023-11-27 NOTE — CARE PLAN
Patient is a 63yo M with pmhx of ESRD MWF, CHFpEF, HTN who presented to the ED for shortness of breath. Per report he last had HD Friday.    He was hypertensive with sBP 200s. CXR with pulmonary edema. He was placed on 2L NC with increased work of breathing so transitioned to bipap.  Given Hydralazine IV then required nitroglycerin gtt.       #ESRD MWF via AVF  #HTN emergency      Recommendations:  -plan for urgent 2h iHD with goal 3-4L UF    Full consult to follow in AM    Godwin Last, DO  PGY 4 Nephrology Fellow

## 2023-11-28 PROCEDURE — 2500000001 HC RX 250 WO HCPCS SELF ADMINISTERED DRUGS (ALT 637 FOR MEDICARE OP): Performed by: STUDENT IN AN ORGANIZED HEALTH CARE EDUCATION/TRAINING PROGRAM

## 2023-11-28 PROCEDURE — 2500000001 HC RX 250 WO HCPCS SELF ADMINISTERED DRUGS (ALT 637 FOR MEDICARE OP): Performed by: INTERNAL MEDICINE

## 2023-11-28 PROCEDURE — 2500000004 HC RX 250 GENERAL PHARMACY W/ HCPCS (ALT 636 FOR OP/ED): Performed by: STUDENT IN AN ORGANIZED HEALTH CARE EDUCATION/TRAINING PROGRAM

## 2023-11-28 PROCEDURE — 99233 SBSQ HOSP IP/OBS HIGH 50: CPT | Performed by: INTERNAL MEDICINE

## 2023-11-28 PROCEDURE — 99291 CRITICAL CARE FIRST HOUR: CPT | Performed by: EMERGENCY MEDICINE

## 2023-11-28 PROCEDURE — 1200000002 HC GENERAL ROOM WITH TELEMETRY DAILY

## 2023-11-28 PROCEDURE — 99233 SBSQ HOSP IP/OBS HIGH 50: CPT | Performed by: NURSE PRACTITIONER

## 2023-11-28 RX ORDER — LABETALOL HYDROCHLORIDE 5 MG/ML
20 INJECTION, SOLUTION INTRAVENOUS ONCE
Status: COMPLETED | OUTPATIENT
Start: 2023-11-28 | End: 2023-11-28

## 2023-11-28 RX ORDER — CLONIDINE HYDROCHLORIDE 0.1 MG/1
0.1 TABLET ORAL EVERY 12 HOURS SCHEDULED
Status: DISCONTINUED | OUTPATIENT
Start: 2023-11-28 | End: 2023-11-29

## 2023-11-28 RX ORDER — NIFEDIPINE 90 MG/1
90 TABLET, EXTENDED RELEASE ORAL DAILY
Status: DISCONTINUED | OUTPATIENT
Start: 2023-11-29 | End: 2023-11-30 | Stop reason: HOSPADM

## 2023-11-28 RX ORDER — HYDRALAZINE HYDROCHLORIDE 20 MG/ML
10 INJECTION INTRAMUSCULAR; INTRAVENOUS ONCE
Status: COMPLETED | OUTPATIENT
Start: 2023-11-28 | End: 2023-11-28

## 2023-11-28 RX ADMIN — LABETALOL HYDROCHLORIDE 20 MG: 5 INJECTION INTRAVENOUS at 04:15

## 2023-11-28 RX ADMIN — ATORVASTATIN CALCIUM 40 MG: 40 TABLET, FILM COATED ORAL at 09:33

## 2023-11-28 RX ADMIN — LABETALOL HYDROCHLORIDE 200 MG: 200 TABLET, FILM COATED ORAL at 16:48

## 2023-11-28 RX ADMIN — CLONIDINE HYDROCHLORIDE 0.1 MG: 0.1 TABLET ORAL at 12:06

## 2023-11-28 RX ADMIN — CLONIDINE 1 PATCH: 0.1 PATCH TRANSDERMAL at 09:36

## 2023-11-28 RX ADMIN — HYDRALAZINE HYDROCHLORIDE 10 MG: 20 INJECTION INTRAMUSCULAR; INTRAVENOUS at 00:53

## 2023-11-28 RX ADMIN — HYDRALAZINE HYDROCHLORIDE 100 MG: 50 TABLET, FILM COATED ORAL at 20:47

## 2023-11-28 RX ADMIN — HYDRALAZINE HYDROCHLORIDE 100 MG: 50 TABLET, FILM COATED ORAL at 04:15

## 2023-11-28 RX ADMIN — NIFEDIPINE 60 MG: 60 TABLET, FILM COATED, EXTENDED RELEASE ORAL at 09:36

## 2023-11-28 RX ADMIN — LOSARTAN POTASSIUM 100 MG: 100 TABLET, FILM COATED ORAL at 09:36

## 2023-11-28 RX ADMIN — RENO CAPS 1 CAPSULE: 100; 1.5; 1.7; 20; 10; 1; 150; 5; 6 CAPSULE ORAL at 09:33

## 2023-11-28 RX ADMIN — HYDRALAZINE HYDROCHLORIDE 100 MG: 50 TABLET, FILM COATED ORAL at 12:05

## 2023-11-28 RX ADMIN — CLONIDINE HYDROCHLORIDE 0.1 MG: 0.1 TABLET ORAL at 20:47

## 2023-11-28 RX ADMIN — ASPIRIN 81 MG CHEWABLE TABLET 81 MG: 81 TABLET CHEWABLE at 09:35

## 2023-11-28 ASSESSMENT — PAIN SCALES - GENERAL
PAINLEVEL_OUTOF10: 0 - NO PAIN

## 2023-11-28 ASSESSMENT — COGNITIVE AND FUNCTIONAL STATUS - GENERAL
STANDING UP FROM CHAIR USING ARMS: A LITTLE
CLIMB 3 TO 5 STEPS WITH RAILING: A LITTLE
DAILY ACTIVITIY SCORE: 24
STANDING UP FROM CHAIR USING ARMS: A LITTLE
MOBILITY SCORE: 20
WALKING IN HOSPITAL ROOM: A LITTLE
DAILY ACTIVITIY SCORE: 24
MOVING TO AND FROM BED TO CHAIR: A LITTLE
CLIMB 3 TO 5 STEPS WITH RAILING: A LITTLE
MOBILITY SCORE: 20
WALKING IN HOSPITAL ROOM: A LITTLE
MOVING TO AND FROM BED TO CHAIR: A LITTLE

## 2023-11-28 ASSESSMENT — PAIN - FUNCTIONAL ASSESSMENT
PAIN_FUNCTIONAL_ASSESSMENT: 0-10

## 2023-11-28 ASSESSMENT — PAIN SCALES - WONG BAKER: WONGBAKER_NUMERICALRESPONSE: NO HURT

## 2023-11-28 NOTE — CARE PLAN
Problem: Pain - Adult  Goal: Verbalizes/displays adequate comfort level or baseline comfort level  Outcome: Progressing     Problem: Safety - Adult  Goal: Free from fall injury  Outcome: Progressing     Problem: Discharge Planning  Goal: Discharge to home or other facility with appropriate resources  Outcome: Progressing     Problem: Chronic Conditions and Co-morbidities  Goal: Patient's chronic conditions and co-morbidity symptoms are monitored and maintained or improved  Outcome: Progressing     Problem: Safety  Goal: Patient will be injury free during hospitalization  Outcome: Progressing  Goal: I will remain free of falls  Outcome: Progressing     Problem: Daily Care  Goal: Daily care needs are met  Outcome: Progressing     Problem: Skin  Goal: Decreased wound size/increased tissue granulation at next dressing change  Outcome: Progressing  Goal: Participates in plan/prevention/treatment measures  Outcome: Progressing  Goal: Prevent/manage excess moisture  Outcome: Progressing  Goal: Prevent/minimize sheer/friction injuries  Outcome: Progressing  Goal: Promote/optimize nutrition  Outcome: Progressing  Goal: Promote skin healing  Outcome: Progressing   The patient's goals for the shift include      The clinical goals for the shift include Will have BP WNL by end of the  shift

## 2023-11-28 NOTE — PROGRESS NOTES
Physical Therapy                 Therapy Communication Note    Patient Name: Marcelino Connors  MRN: [id number]  Today's Date: 11/28/2023     Discipline: Physical Therapy      Pt screened this visit. Pt stated he has been ambulating without difficulty and denies need for PT services. Will d/c PT order.    Herminia Manuel, PT

## 2023-11-28 NOTE — PROGRESS NOTES
"Marcelino Connors is a 62 y.o. male on day 2 of admission presenting with Hypertensive emergency.    Subjective   Pt resting in bed watching tv. He denies sob, n/v/d, constipation, fever, cough, chills, pain, chest pains. Feels like too much fluid removed during tx       Objective     Physical Exam  Vitals and nursing note reviewed.   Constitutional:       Appearance: Normal appearance.   Cardiovascular:      Rate and Rhythm: Normal rate and regular rhythm.      Pulses: Normal pulses.      Comments: Tele-72  Pulmonary:      Breath sounds: Normal breath sounds.   Abdominal:      General: Abdomen is flat.   Genitourinary:     Comments: anuric  Musculoskeletal:      Comments: Ble without edema   Skin:     General: Skin is warm and dry.   Neurological:      Mental Status: He is alert and oriented to person, place, and time.   Psychiatric:         Mood and Affect: Mood normal.         Behavior: Behavior normal.         Last Recorded Vitals  Blood pressure (!) 201/106, pulse 73, temperature 37.1 °C (98.8 °F), resp. rate 20, height 1.676 m (5' 6\"), weight 59.1 kg (130 lb 4.7 oz), SpO2 100 %.  Intake/Output last 3 Shifts:  I/O last 3 completed shifts:  In: 2683.7 (45.4 mL/kg) [P.O.:600; I.V.:1683.7 (28.5 mL/kg); Other:400]  Out: 3000 (50.8 mL/kg) [Other:3000]  Weight: 59.1 kg                  Scheduled medications  aspirin, 81 mg, oral, Daily  atorvastatin, 40 mg, oral, Daily  B complex-vitamin C-folic acid, 1 capsule, oral, Daily  cloNIDine, 0.1 mg, oral, q12h NOMI  heparin (porcine), 5,000 Units, subcutaneous, q8h  hydrALAZINE, 100 mg, oral, q8h  labetalol, 1 tablet, oral, q12h  losartan, 100 mg, oral, Daily  [START ON 11/29/2023] NIFEdipine ER, 90 mg, oral, Daily      Continuous medications     PRN medications  PRN medications: oxygen   Results for orders placed or performed during the hospital encounter of 11/26/23 (from the past 96 hour(s))   CBC and Auto Differential   Result Value Ref Range    WBC 6.0 4.4 - 11.3 x10*3/uL    " nRBC 0.0 0.0 - 0.0 /100 WBCs    RBC 3.59 (L) 4.50 - 5.90 x10*6/uL    Hemoglobin 10.6 (L) 13.5 - 17.5 g/dL    Hematocrit 32.6 (L) 41.0 - 52.0 %    MCV 91 80 - 100 fL    MCH 29.5 26.0 - 34.0 pg    MCHC 32.5 32.0 - 36.0 g/dL    RDW 15.9 (H) 11.5 - 14.5 %    Platelets 158 150 - 450 x10*3/uL    Neutrophils % 56.5 40.0 - 80.0 %    Immature Granulocytes %, Automated 0.2 0.0 - 0.9 %    Lymphocytes % 20.8 13.0 - 44.0 %    Monocytes % 12.6 2.0 - 10.0 %    Eosinophils % 8.6 0.0 - 6.0 %    Basophils % 1.3 0.0 - 2.0 %    Neutrophils Absolute 3.40 1.20 - 7.70 x10*3/uL    Immature Granulocytes Absolute, Automated 0.01 0.00 - 0.70 x10*3/uL    Lymphocytes Absolute 1.25 1.20 - 4.80 x10*3/uL    Monocytes Absolute 0.76 0.10 - 1.00 x10*3/uL    Eosinophils Absolute 0.52 0.00 - 0.70 x10*3/uL    Basophils Absolute 0.08 0.00 - 0.10 x10*3/uL   Comprehensive Metabolic Panel   Result Value Ref Range    Glucose 86 74 - 99 mg/dL    Sodium 143 136 - 145 mmol/L    Potassium 4.6 3.5 - 5.3 mmol/L    Chloride 97 (L) 98 - 107 mmol/L    Bicarbonate 33 (H) 21 - 32 mmol/L    Anion Gap 18 10 - 20 mmol/L    Urea Nitrogen 47 (H) 6 - 23 mg/dL    Creatinine 12.46 (H) 0.50 - 1.30 mg/dL    eGFR 4 (L) >60 mL/min/1.73m*2    Calcium 9.2 8.6 - 10.6 mg/dL    Albumin 3.7 3.4 - 5.0 g/dL    Alkaline Phosphatase 82 33 - 136 U/L    Total Protein 7.1 6.4 - 8.2 g/dL    AST 22 9 - 39 U/L    Bilirubin, Total 0.5 0.0 - 1.2 mg/dL    ALT 12 10 - 52 U/L   Troponin I, High Sensitivity   Result Value Ref Range    Troponin I, High Sensitivity 78 (H) 0 - 53 ng/L   B-Type Natriuretic Peptide   Result Value Ref Range    BNP 2,725 (H) 0 - 99 pg/mL   Phosphorus   Result Value Ref Range    Phosphorus 6.2 (H) 2.5 - 4.9 mg/dL   Magnesium   Result Value Ref Range    Magnesium 1.83 1.60 - 2.40 mg/dL   Hemoglobin A1c   Result Value Ref Range    Hemoglobin A1C 4.5 see below %    Estimated Average Glucose 82 Not Established mg/dL   Blood Gas Venous Full Panel Unsolicited   Result Value Ref  Range    POCT pH, Venous 7.46 (H) 7.33 - 7.43 pH    POCT pCO2, Venous 47 41 - 51 mm Hg    POCT pO2, Venous 65 (H) 35 - 45 mm Hg    POCT SO2, Venous 91 (H) 45 - 75 %    POCT Oxy Hemoglobin, Venous 88.4 (H) 45.0 - 75.0 %    POCT Hematocrit Calculated, Venous 33.0 (L) 41.0 - 52.0 %    POCT Sodium, Venous 139 136 - 145 mmol/L    POCT Potassium, Venous 4.7 3.5 - 5.3 mmol/L    POCT Chloride, Venous 100 98 - 107 mmol/L    POCT Ionized Calicum, Venous 1.09 (L) 1.10 - 1.33 mmol/L    POCT Glucose, Venous 75 74 - 99 mg/dL    POCT Lactate, Venous 0.5 0.4 - 2.0 mmol/L    POCT Base Excess, Venous 8.5 (H) -2.0 - 3.0 mmol/L    POCT HCO3 Calculated, Venous 33.4 (H) 22.0 - 26.0 mmol/L    POCT Hemoglobin, Venous 10.9 (L) 13.5 - 17.5 g/dL    POCT Anion Gap, Venous 10.0 10.0 - 25.0 mmol/L    Patient Temperature 37.0 degrees Celsius   Troponin I, High Sensitivity   Result Value Ref Range    Troponin I, High Sensitivity 73 (H) 0 - 53 ng/L   Ferritin   Result Value Ref Range    Ferritin 681 (H) 20 - 300 ng/mL   Sars-CoV-2 PCR, Symptomatic   Result Value Ref Range    Coronavirus 2019, PCR Not Detected Not Detected   Influenza A, and B PCR   Result Value Ref Range    Flu A Result Not Detected Not Detected    Flu B Result Not Detected Not Detected   ECG 12 lead   Result Value Ref Range    Ventricular Rate 74 BPM    Atrial Rate 74 BPM    DC Interval 190 ms    QRS Duration 104 ms    QT Interval 456 ms    QTC Calculation(Bazett) 506 ms    P Axis 74 degrees    R Axis 106 degrees    T Axis 12 degrees    QRS Count 12 beats    Q Onset 213 ms    P Onset 118 ms    P Offset 172 ms    T Offset 441 ms    QTC Fredericia 489 ms   CBC and Auto Differential   Result Value Ref Range    WBC 7.3 4.4 - 11.3 x10*3/uL    nRBC 0.0 0.0 - 0.0 /100 WBCs    RBC 3.73 (L) 4.50 - 5.90 x10*6/uL    Hemoglobin 10.9 (L) 13.5 - 17.5 g/dL    Hematocrit 33.9 (L) 41.0 - 52.0 %    MCV 91 80 - 100 fL    MCH 29.2 26.0 - 34.0 pg    MCHC 32.2 32.0 - 36.0 g/dL    RDW 16.1 (H) 11.5 -  14.5 %    Platelets 172 150 - 450 x10*3/uL    Neutrophils % 63.1 40.0 - 80.0 %    Immature Granulocytes %, Automated 0.3 0.0 - 0.9 %    Lymphocytes % 16.5 13.0 - 44.0 %    Monocytes % 11.2 2.0 - 10.0 %    Eosinophils % 7.5 0.0 - 6.0 %    Basophils % 1.4 0.0 - 2.0 %    Neutrophils Absolute 4.64 1.20 - 7.70 x10*3/uL    Immature Granulocytes Absolute, Automated 0.02 0.00 - 0.70 x10*3/uL    Lymphocytes Absolute 1.21 1.20 - 4.80 x10*3/uL    Monocytes Absolute 0.82 0.10 - 1.00 x10*3/uL    Eosinophils Absolute 0.55 0.00 - 0.70 x10*3/uL    Basophils Absolute 0.10 0.00 - 0.10 x10*3/uL   Iron and TIBC   Result Value Ref Range    Iron 44 35 - 150 ug/dL    UIBC 182 110 - 370 ug/dL    TIBC 226 ug/dL    % Saturation 19 %   Vitamin B12   Result Value Ref Range    Vitamin B12 406 211 - 911 pg/mL   Folate   Result Value Ref Range    Folate, Serum 14.6 >5.0 ng/mL   Renal function panel   Result Value Ref Range    Glucose 94 74 - 99 mg/dL    Sodium 138 136 - 145 mmol/L    Potassium 4.5 3.5 - 5.3 mmol/L    Chloride 96 (L) 98 - 107 mmol/L    Bicarbonate 28 21 - 32 mmol/L    Anion Gap 19 10 - 20 mmol/L    Urea Nitrogen 28 (H) 6 - 23 mg/dL    Creatinine 8.03 (H) 0.50 - 1.30 mg/dL    eGFR 7 (L) >60 mL/min/1.73m*2    Calcium 9.1 8.6 - 10.6 mg/dL    Phosphorus 3.6 2.5 - 4.9 mg/dL    Albumin 4.2 3.4 - 5.0 g/dL   Magnesium   Result Value Ref Range    Magnesium 1.93 1.60 - 2.40 mg/dL   POCT GLUCOSE   Result Value Ref Range    POCT Glucose 65 (L) 74 - 99 mg/dL   POCT GLUCOSE   Result Value Ref Range    POCT Glucose 133 (H) 74 - 99 mg/dL        Assessment/Plan   Principal Problem:    Hypertensive emergency    Did not Tolerate hemodialysis yesterday. Able to remove net fluid loss 1957cc. 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 current hgb 10.9... will cont to monitor     CKD-MBD Phosphate Binder: B complex-vitamin C-folic acid (Nephrocaps) capsule 1 capsule daily     Plan HD tomorrow with UF as tolerated     Renal diet      Please obtain daily standing wt (if possible)     Medication to be adjusted for ESRD      Patient to continue regular HD schedule while inpatient and to follow with the outpatient nephrologist at discharge        I      TIAGO Alan-CNP

## 2023-11-28 NOTE — CARE PLAN
The patient's goals for the shift include      The clinical goals for the shift include Will have BP WNL by end of the  shift    BP elevated throughout shift. Medicated as ordered with slight improvement. Asymptomatic.

## 2023-11-28 NOTE — PROGRESS NOTES
Transitional Care Coordination Progress Note:  PLAN: New MICU transfer. Need to control BP and be cleared by Nephro for discharge.     PAYOR: Medicare A/B    DISPO: Pending PT/OT danica RICK TBD    SUPPORT/CONTACT: Erica [phone]    Natasha Tse RN, TCC

## 2023-11-28 NOTE — NURSING NOTE
Report to Receiving RN:    Report To: Ashtyn bedside RN  Time Report Called: 21:15  Hand-Off Communication: pt was hypertensive throughout tx systolic >200's, BP meds given for intervention, refer MAR; post tx /105 HR 79 pt was asymptomatic, fluid removal 1350mL  Complications During Treatment: No  Ultrafiltration Treatment: Yes  Medications Administered During Dialysis: Yes, see MAR  Blood Products Administered During Dialysis: No  Labs Sent During Dialysis: No  Heparin Drip Rate Changes: N/A      Last Updated: 9:15 PM by MOOKIE YANES

## 2023-11-28 NOTE — PROGRESS NOTES
Marcelino Connors is a 62 y.o. male on day 2 of admission presenting with Hypertensive emergency.      Dietary Orders (From admission, onward)               May Participate in Room Service  Once        Question:  .  Answer:  Yes        Adult diet Renal; Potassium Restricted 2 gm (50mEq); 2 - 3 grams Sodium  Diet effective now        Question Answer Comment   Diet type Renal    Potassium restriction: Potassium Restricted 2 gm (50mEq)    Sodium restriction: 2 - 3 grams Sodium                          Objective     Vitals  Temp:  [36.5 °C (97.7 °F)-38 °C (100.4 °F)] 37.1 °C (98.8 °F)  Heart Rate:  [70-79] 76  Resp:  [17-26] 22  BP: (170-202)/() 193/106       Pain Score: 0 - No pain         Peripheral IV 11/26/23 18 G Right Antecubital (Active)   Number of days: 2       Vent Settings       Intake/Output Summary (Last 24 hours) at 11/28/2023 1120  Last data filed at 11/27/2023 2200  Gross per 24 hour   Intake 1040 ml   Output 0 ml   Net 1040 ml       Relevant Results          Scheduled medications  aspirin, 81 mg, oral, Daily  atorvastatin, 40 mg, oral, Daily  B complex-vitamin C-folic acid, 1 capsule, oral, Daily  cloNIDine, 1 patch, transdermal, Weekly  heparin (porcine), 5,000 Units, subcutaneous, q8h  hydrALAZINE, 100 mg, oral, q8h  labetalol, 1 tablet, oral, q12h  losartan, 100 mg, oral, Daily  NIFEdipine ER, 60 mg, oral, Daily      Continuous medications     PRN medications  PRN medications: oxygen  Results for orders placed or performed during the hospital encounter of 11/26/23 (from the past 24 hour(s))   POCT GLUCOSE   Result Value Ref Range    POCT Glucose 133 (H) 74 - 99 mg/dL        Lower extremity venous duplex right    Result Date: 11/27/2023  Interpreted By:  Gustavo Giles,  and Fiorella Varghese STUDY: [facility] LOWER EXTREMITY VENOUS DUPLEX RIGHT;  11/27/2023 12:56 am   INDICATION: Signs/Symptoms:sob, pain.   COMPARISON: None.   ACCESSION NUMBER(S): [id number]   ORDERING CLINICIAN: ELISHA OLEA    TECHNIQUE: Vascular ultrasound of the right lower extremity was performed. Real-time compression views as well as Gray scale, color Doppler and spectral Doppler waveform analysis was performed.   FINDINGS: Evaluation of the visualized portions of the bilateral common femoral vein, proximal, mid, and distal femoral vein, and popliteal vein were performed.  Evaluation of the visualized portions of the  posterior tibial and peroneal veins were also performed.   Limitations:  None.   There is heterogenously echogenic material within the mid to distal femoral vein that demonstrates partial compressibility and overall intact flow. There is duplication of the mid to distal right common femoral vein.   There is somewhat pulsatile flow of the venous system likely secondary to the patient's known left upper extremity fistula.   The remainder of the evaluated veins demonstrate normal compressibility. There is intact venous flow demonstrating normal respiratory variability and normal augmentation of flow with calf compression. Therefore, there is no ultrasonographic evidence for deep vein thrombosis within the evaluated veins.   Respiratory variation and augmentation to calf pressure was noted.   Note is made of atheromatous plaques in the arterial vasculature.   A 4.8 x 0.8 x 2.1 cm predominantly anechoic structure with internal debris/septations is seen in the right popliteal fossa. There is no abnormal vascular flow.   There are prominent morphologically normal appearing bilateral inguinal lymph nodes.       1. Chronic appearing clot in the mid to distal right femoral vein with intact venous flow. 2. Remainder of the evaluated veins in the right lower extremity demonstrate no evidence of deep vein thrombosis. 3. 4.8 cm complex fluid collection in the right popliteal fossa is indeterminate but likely represents a Baker's cyst.   I personally reviewed the images/study and I agree with the findings as stated by Abimael  MD Fiorella (resident) . This study was interpreted at [place], [place].   MACRO: None   Signed by: Gustavo Giles 11/27/2023 5:38 AM Dictation workstation:   [id number]    ECG 12 lead    Result Date: 11/26/2023  Normal sinus rhythm Rightward axis Biventricular hypertrophy Septal infarct (cited on or before 26-NOV-2023) Prolonged QT Abnormal ECG When compared with ECG of 18-SEP-2023 06:08, Previous ECG has undetermined rhythm, needs review Serial changes of evolving Septal infarct Present See ED provider note for full interpretation and clinical correlation Confirmed by Jaqueline Galindo ([id number]) on 11/26/2023 10:30:42 PM    XR chest 1 view    Result Date: 11/26/2023  Interpreted By:  Tereso Luna,  and Fiorella Varghese STUDY: XR CHEST 1 VIEW;  11/26/2023 9:42 pm   INDICATION: Signs/Symptoms:fluid overload, history HD.   COMPARISON: Chest radiograph 09/18/2023, 06/15/2023   ACCESSION NUMBER(S): [id number]   ORDERING CLINICIAN: INEZ CHILDS   FINDINGS: AP radiograph of the chest was provided.     CARDIOMEDIASTINAL SILHOUETTE: Cardiac silhouette is enlarged and stable.   LUNGS: Prominent perihilar and interstitial lung markings. Note is made of Kerley B-lines in the bilateral peripheral bases. No focal consolidation. Blunting of the bilateral costophrenic angles suggestive of small bilateral pleural effusions. No pneumothorax is seen.   ABDOMEN: No remarkable upper abdominal findings.   BONES: No acute osseous abnormality. Multiple soft tissue surgical clips are visualized projecting over the left upper extremity.       1.  Findings suspicious for pulmonary edema and small bilateral pleural effusions.   I personally reviewed the images/study and I agree with the findings as stated by Abimael Barros MD (resident) . This study was interpreted at [facility], [place], [place].   MACRO: None   Signed by: Tereso Luna  11/26/2023 9:47 PM Dictation workstation:   [id number]             Assessment/Plan                    S/  See and examined  No new complaints no new event over the night   ROS: Negative    O/   General Appearance: Alert, Oriented X3, Cooperative, Not in Acute Distress  HEENT: no eye redness, not pale, no jaundice, Throat: not congested, no ulcers    Chest: Good AE bilateral, No crackles, no ronchies, no wheezes.   Heart: RHR, Normal heart sounds S1, S2, no murmur or gallop,   Abdomen: Soft, lax, no hepatosplenomegaly, intact hernia orifices, negative chandler sign, no tenderness,   Genitalia: no abnormal discharge, no ulcers, no swelling.  Lymphatics: no lymphadenopathy, supraclavicular, inguinal trochlear, or axilla.   Neurology: Intact cranial nerves 2 to 12, intact sensation, intact motor function (Power, tone and reflexes). Normal DTR reflexes.   Extremities: no signs of PAD, no swelling no ulcers   Back: no deformity, no SI tenderness, no ulcers.   Skin: no signs of dehydration, no Rash, Bruises, or purpura.      AS:    Mr. Marcelino Connors is a 63 yo M w/ PMH of ESRD (MWF), HTN, and HFpEF (EF 60-65%),chronic HTN admitted initially to MICU for acute hypoxic respiratory failure 2/2 pulmonary edema d/t hypertensive emergency, and volume overload. Nephrology was consulted and patient underwent urgent HH transferred after HD to floor. pending BP control and final nephro rec.  Dc clonidine patch, start clonidine po 0.1 mg  #Hypertensive emergency, improved   - /89 this AM, was 240/120 on arrival,   - s/p urgent HD   -  s/p Nitro GGT    - resume Home meds as tolerated:     -continue hydralazine 100mg PO TID (4 AM)    -continue losartan 100mg PO daily (due 9 AM)    -continue nifedipine 60mg PO daily (due 9 AM)    -continue labetalol 200mg PO q12h (4 AM)    -continue clonidine 0.1mg/24h patch daily (due for next dose 11/28)     #HFpEF (EF 60-65% 3/8/2022) likely 2/2 chronic HTN  -BNP 2725 (baseline 1,629) in s/o BMI  21  -pt counseled on low Na diet  - CPAP as needed  #. ESRD 2/2 hypertensive nephropathy   -HD MWF ([facility]) via L UE AVF  -nephrology following    #Chronic anemia likely 2/2 ESRD  -At baseline,baseline Hb 10-11     #Chronic non-occlusive clot of R femoral vein  -identified on DVT US in ED (11/27)  Code Status: Full  DVT ppx:  heparin   Disp: PT/ot     Spoke to my patient, Discussed the medical, social conditions, the reason for this admission explained our plan and recommendations.  Time spent on the assessment of patient, gathering and interpreting data, review of medical record/patient history, personally reviewing radiographic imaging. With greater than 50% spent in personal discussion with patient.  Time > 45 min        Alexis Patel MD

## 2023-11-29 ENCOUNTER — APPOINTMENT (OUTPATIENT)
Dept: DIALYSIS | Facility: HOSPITAL | Age: 63
End: 2023-11-29
Payer: MEDICARE

## 2023-11-29 LAB
ALBUMIN SERPL BCP-MCNC: 3.4 G/DL (ref 3.4–5)
ALP SERPL-CCNC: 88 U/L (ref 33–136)
ALT SERPL W P-5'-P-CCNC: 14 U/L (ref 10–52)
ANION GAP SERPL CALC-SCNC: 18 MMOL/L (ref 10–20)
AST SERPL W P-5'-P-CCNC: 26 U/L (ref 9–39)
BILIRUB SERPL-MCNC: 0.6 MG/DL (ref 0–1.2)
BUN SERPL-MCNC: 40 MG/DL (ref 6–23)
CALCIUM SERPL-MCNC: 9 MG/DL (ref 8.6–10.6)
CHLORIDE SERPL-SCNC: 97 MMOL/L (ref 98–107)
CO2 SERPL-SCNC: 29 MMOL/L (ref 21–32)
CREAT SERPL-MCNC: 11.09 MG/DL (ref 0.5–1.3)
ERYTHROCYTE [DISTWIDTH] IN BLOOD BY AUTOMATED COUNT: 15.4 % (ref 11.5–14.5)
GFR SERPL CREATININE-BSD FRML MDRD: 5 ML/MIN/1.73M*2
GLUCOSE SERPL-MCNC: 90 MG/DL (ref 74–99)
HCT VFR BLD AUTO: 36 % (ref 41–52)
HGB BLD-MCNC: 12.5 G/DL (ref 13.5–17.5)
MCH RBC QN AUTO: 30.9 PG (ref 26–34)
MCHC RBC AUTO-ENTMCNC: 34.7 G/DL (ref 32–36)
MCV RBC AUTO: 89 FL (ref 80–100)
NRBC BLD-RTO: 0 /100 WBCS (ref 0–0)
PLATELET # BLD AUTO: 180 X10*3/UL (ref 150–450)
POTASSIUM SERPL-SCNC: 4.6 MMOL/L (ref 3.5–5.3)
PROT SERPL-MCNC: 6.5 G/DL (ref 6.4–8.2)
RBC # BLD AUTO: 4.04 X10*6/UL (ref 4.5–5.9)
SODIUM SERPL-SCNC: 139 MMOL/L (ref 136–145)
WBC # BLD AUTO: 6.1 X10*3/UL (ref 4.4–11.3)

## 2023-11-29 PROCEDURE — 99232 SBSQ HOSP IP/OBS MODERATE 35: CPT | Performed by: INTERNAL MEDICINE

## 2023-11-29 PROCEDURE — 36415 COLL VENOUS BLD VENIPUNCTURE: CPT | Performed by: INTERNAL MEDICINE

## 2023-11-29 PROCEDURE — 1200000002 HC GENERAL ROOM WITH TELEMETRY DAILY

## 2023-11-29 PROCEDURE — 85027 COMPLETE CBC AUTOMATED: CPT | Performed by: INTERNAL MEDICINE

## 2023-11-29 PROCEDURE — 2500000004 HC RX 250 GENERAL PHARMACY W/ HCPCS (ALT 636 FOR OP/ED): Performed by: INTERNAL MEDICINE

## 2023-11-29 PROCEDURE — 8010000001 HC DIALYSIS - HEMODIALYSIS PER DAY

## 2023-11-29 PROCEDURE — 2500000001 HC RX 250 WO HCPCS SELF ADMINISTERED DRUGS (ALT 637 FOR MEDICARE OP): Performed by: INTERNAL MEDICINE

## 2023-11-29 PROCEDURE — 2500000001 HC RX 250 WO HCPCS SELF ADMINISTERED DRUGS (ALT 637 FOR MEDICARE OP): Performed by: STUDENT IN AN ORGANIZED HEALTH CARE EDUCATION/TRAINING PROGRAM

## 2023-11-29 PROCEDURE — 84075 ASSAY ALKALINE PHOSPHATASE: CPT | Performed by: INTERNAL MEDICINE

## 2023-11-29 PROCEDURE — 90935 HEMODIALYSIS ONE EVALUATION: CPT | Performed by: INTERNAL MEDICINE

## 2023-11-29 RX ORDER — CLONIDINE HYDROCHLORIDE 0.1 MG/1
0.1 TABLET ORAL EVERY 12 HOURS SCHEDULED
Qty: 60 TABLET | Refills: 0 | Status: CANCELLED | OUTPATIENT
Start: 2023-11-29 | End: 2023-12-29

## 2023-11-29 RX ORDER — CARVEDILOL 12.5 MG/1
25 TABLET ORAL 2 TIMES DAILY
Status: DISCONTINUED | OUTPATIENT
Start: 2023-11-29 | End: 2023-11-30 | Stop reason: HOSPADM

## 2023-11-29 RX ORDER — LORAZEPAM 1 MG/1
1 TABLET ORAL ONCE
Status: COMPLETED | OUTPATIENT
Start: 2023-11-29 | End: 2023-11-29

## 2023-11-29 RX ORDER — CLONIDINE HYDROCHLORIDE 0.1 MG/1
0.2 TABLET ORAL EVERY 12 HOURS SCHEDULED
Status: DISCONTINUED | OUTPATIENT
Start: 2023-11-29 | End: 2023-11-30 | Stop reason: HOSPADM

## 2023-11-29 RX ORDER — ISOSORBIDE MONONITRATE 30 MG/1
60 TABLET, EXTENDED RELEASE ORAL DAILY
Status: DISCONTINUED | OUTPATIENT
Start: 2023-11-29 | End: 2023-11-30 | Stop reason: HOSPADM

## 2023-11-29 RX ADMIN — HYDRALAZINE HYDROCHLORIDE 100 MG: 50 TABLET, FILM COATED ORAL at 20:57

## 2023-11-29 RX ADMIN — ASPIRIN 81 MG CHEWABLE TABLET 81 MG: 81 TABLET CHEWABLE at 13:24

## 2023-11-29 RX ADMIN — HYDRALAZINE HYDROCHLORIDE 100 MG: 50 TABLET, FILM COATED ORAL at 05:09

## 2023-11-29 RX ADMIN — HYDRALAZINE HYDROCHLORIDE 100 MG: 50 TABLET, FILM COATED ORAL at 13:23

## 2023-11-29 RX ADMIN — NIFEDIPINE 90 MG: 90 TABLET, FILM COATED, EXTENDED RELEASE ORAL at 13:23

## 2023-11-29 RX ADMIN — ISOSORBIDE MONONITRATE 60 MG: 30 TABLET, EXTENDED RELEASE ORAL at 13:24

## 2023-11-29 RX ADMIN — LORAZEPAM 1 MG: 1 TABLET ORAL at 13:23

## 2023-11-29 RX ADMIN — LABETALOL HYDROCHLORIDE 200 MG: 200 TABLET, FILM COATED ORAL at 05:09

## 2023-11-29 RX ADMIN — CARVEDILOL 25 MG: 12.5 TABLET, FILM COATED ORAL at 20:57

## 2023-11-29 RX ADMIN — CLONIDINE HYDROCHLORIDE 0.1 MG: 0.1 TABLET ORAL at 09:56

## 2023-11-29 RX ADMIN — CLONIDINE HYDROCHLORIDE 0.2 MG: 0.1 TABLET ORAL at 20:57

## 2023-11-29 RX ADMIN — CARVEDILOL 25 MG: 12.5 TABLET, FILM COATED ORAL at 13:23

## 2023-11-29 RX ADMIN — RENO CAPS 1 CAPSULE: 100; 1.5; 1.7; 20; 10; 1; 150; 5; 6 CAPSULE ORAL at 13:24

## 2023-11-29 RX ADMIN — ATORVASTATIN CALCIUM 40 MG: 40 TABLET, FILM COATED ORAL at 13:24

## 2023-11-29 RX ADMIN — LOSARTAN POTASSIUM 100 MG: 100 TABLET, FILM COATED ORAL at 13:24

## 2023-11-29 ASSESSMENT — COGNITIVE AND FUNCTIONAL STATUS - GENERAL
MOBILITY SCORE: 24
DAILY ACTIVITIY SCORE: 24

## 2023-11-29 ASSESSMENT — PAIN SCALES - GENERAL
PAINLEVEL_OUTOF10: 10 - WORST POSSIBLE PAIN
PAINLEVEL_OUTOF10: 0 - NO PAIN
PAINLEVEL_OUTOF10: 0 - NO PAIN

## 2023-11-29 ASSESSMENT — PAIN - FUNCTIONAL ASSESSMENT: PAIN_FUNCTIONAL_ASSESSMENT: 0-10

## 2023-11-29 NOTE — NURSING NOTE
Report from Sending RN:    Report From: Brandy ( [email or web address])  Recent Surgery of Procedure: No  Baseline Level of Consciousness (LOC): A/O x 4  Oxygen Use: No  Type: none  Diabetic: No  Last BP Med Given Day of Dialysis: hydralazine 100 mg; Labetalol 200 mg 0509 am  Last Pain Med Given: none  Lab Tests to be Obtained with Dialysis: Yes; CBC, CMP  Blood Transfusion to be Given During Dialysis: No  Available IV Access: Yes  Medications to be Administered During Dialysis: No  Continuous IV Infusion Running: No  Restraints on Currently or in the Last 24 Hours: No  Hand-Off Communication: no acute overnight or morning events; vss; Pt did take morning medications; Pt will need labs; Pt may go off unit without telemetry; Pt is a full code; Mary Kay Hernandez RN.

## 2023-11-29 NOTE — PROGRESS NOTES
Renal Staff HD Note    Patient seen, examined on dialysis   Tolerating treatment without event  176/97 65   AVG L UE   55.9 kg (60 kg DW)   3K    Continue treatment per submitted orders  Fluid removal limited by cramping  1 L fluid removal

## 2023-11-29 NOTE — PROGRESS NOTES
Marcelino Connors is a 62 y.o. male on day 3 of admission presenting with Hypertensive emergency.      Dietary Orders (From admission, onward)               May Participate in Room Service  Once        Question:  .  Answer:  Yes        Adult diet Renal; Potassium Restricted 2 gm (50mEq); 2 - 3 grams Sodium  Diet effective now        Question Answer Comment   Diet type Renal    Potassium restriction: Potassium Restricted 2 gm (50mEq)    Sodium restriction: 2 - 3 grams Sodium                          Objective     Vitals  Temp:  [36 °C (96.8 °F)-37.4 °C (99.3 °F)] 36 °C (96.8 °F)  Heart Rate:  [70-76] 71  Resp:  [18-22] 20  BP: (166-201)/() 175/87       Pain Score: 0 - No pain  King-Baker FACES Pain Rating: No hurt         Peripheral IV 11/26/23 18 G Right Antecubital (Active)   Number of days: 2       Vent Settings       Intake/Output Summary (Last 24 hours) at 11/29/2023 0824  Last data filed at 11/29/2023 0746  Gross per 24 hour   Intake 200 ml   Output --   Net 200 ml         Relevant Results          Scheduled medications  aspirin, 81 mg, oral, Daily  atorvastatin, 40 mg, oral, Daily  B complex-vitamin C-folic acid, 1 capsule, oral, Daily  cloNIDine, 0.1 mg, oral, q12h NOMI  heparin (porcine), 5,000 Units, subcutaneous, q8h  hydrALAZINE, 100 mg, oral, q8h  labetalol, 1 tablet, oral, q12h  losartan, 100 mg, oral, Daily  NIFEdipine ER, 90 mg, oral, Daily      Continuous medications     PRN medications  PRN medications: oxygen  Results for orders placed or performed during the hospital encounter of 11/26/23 (from the past 24 hour(s))   CBC   Result Value Ref Range    WBC 6.1 4.4 - 11.3 x10*3/uL    nRBC 0.0 0.0 - 0.0 /100 WBCs    RBC 4.04 (L) 4.50 - 5.90 x10*6/uL    Hemoglobin 12.5 (L) 13.5 - 17.5 g/dL    Hematocrit 36.0 (L) 41.0 - 52.0 %    MCV 89 80 - 100 fL    MCH 30.9 26.0 - 34.0 pg    MCHC 34.7 32.0 - 36.0 g/dL    RDW 15.4 (H) 11.5 - 14.5 %    Platelets 180 150 - 450 x10*3/uL        Lower extremity venous duplex  right    Result Date: 11/27/2023  Interpreted By:  Gustavo Giles,  and Fiorella Varghese STUDY: [facility] US LOWER EXTREMITY VENOUS DUPLEX RIGHT;  11/27/2023 12:56 am   INDICATION: Signs/Symptoms:sob, pain.   COMPARISON: None.   ACCESSION NUMBER(S): [id number]   ORDERING CLINICIAN: ELISHA OLEA   TECHNIQUE: Vascular ultrasound of the right lower extremity was performed. Real-time compression views as well as Gray scale, color Doppler and spectral Doppler waveform analysis was performed.   FINDINGS: Evaluation of the visualized portions of the bilateral common femoral vein, proximal, mid, and distal femoral vein, and popliteal vein were performed.  Evaluation of the visualized portions of the  posterior tibial and peroneal veins were also performed.   Limitations:  None.   There is heterogenously echogenic material within the mid to distal femoral vein that demonstrates partial compressibility and overall intact flow. There is duplication of the mid to distal right common femoral vein.   There is somewhat pulsatile flow of the venous system likely secondary to the patient's known left upper extremity fistula.   The remainder of the evaluated veins demonstrate normal compressibility. There is intact venous flow demonstrating normal respiratory variability and normal augmentation of flow with calf compression. Therefore, there is no ultrasonographic evidence for deep vein thrombosis within the evaluated veins.   Respiratory variation and augmentation to calf pressure was noted.   Note is made of atheromatous plaques in the arterial vasculature.   A 4.8 x 0.8 x 2.1 cm predominantly anechoic structure with internal debris/septations is seen in the right popliteal fossa. There is no abnormal vascular flow.   There are prominent morphologically normal appearing bilateral inguinal lymph nodes.       1. Chronic appearing clot in the mid to distal right femoral vein with intact venous flow. 2. Remainder of the evaluated  veins in the right lower extremity demonstrate no evidence of deep vein thrombosis. 3. 4.8 cm complex fluid collection in the right popliteal fossa is indeterminate but likely represents a Baker's cyst.   I personally reviewed the images/study and I agree with the findings as stated by Abimael Barros MD (resident) . This study was interpreted at [facility], [place], [place].   MACRO: None   Signed by: Gustavo Giles 11/27/2023 5:38 AM Dictation workstation:   [id number]    ECG 12 lead    Result Date: 11/26/2023  Normal sinus rhythm Rightward axis Biventricular hypertrophy Septal infarct (cited on or before 26-NOV-2023) Prolonged QT Abnormal ECG When compared with ECG of 18-SEP-2023 06:08, Previous ECG has undetermined rhythm, needs review Serial changes of evolving Septal infarct Present See ED provider note for full interpretation and clinical correlation Confirmed by Jaqueline Galindo ([id number]) on 11/26/2023 10:30:42 PM    XR chest 1 view    Result Date: 11/26/2023  Interpreted By:  Tereso Luna,  and Fiorella Varghese STUDY: XR CHEST 1 VIEW;  11/26/2023 9:42 pm   INDICATION: Signs/Symptoms:fluid overload, history HD.   COMPARISON: Chest radiograph 09/18/2023, 06/15/2023   ACCESSION NUMBER(S): [id number]   ORDERING CLINICIAN: INEZ CHILDS   FINDINGS: AP radiograph of the chest was provided.     CARDIOMEDIASTINAL SILHOUETTE: Cardiac silhouette is enlarged and stable.   LUNGS: Prominent perihilar and interstitial lung markings. Note is made of Kerley B-lines in the bilateral peripheral bases. No focal consolidation. Blunting of the bilateral costophrenic angles suggestive of small bilateral pleural effusions. No pneumothorax is seen.   ABDOMEN: No remarkable upper abdominal findings.   BONES: No acute osseous abnormality. Multiple soft tissue surgical clips are visualized projecting over the left upper extremity.       1.  Findings suspicious for pulmonary edema and small  bilateral pleural effusions.   I personally reviewed the images/study and I agree with the findings as stated by Abimael Barros MD (resident) . This study was interpreted at [facility], [place], [place].   MACRO: None   Signed by: Tereso Luna 11/26/2023 9:47 PM Dictation workstation:   [id number]             Assessment/Plan                    S/  See and examined  No new complaints no new event over the night   ROS: Negative    O/   General Appearance: Alert, Oriented X3, Cooperative, Not in Acute Distress  HEENT: no eye redness, not pale, no jaundice, Throat: not congested, no ulcers    Chest: Good AE bilateral, No crackles, no ronchies, no wheezes.   Heart: RHR, Normal heart sounds S1, S2, no murmur or gallop,   Abdomen: Soft, lax, no hepatosplenomegaly, intact hernia orifices, negative chandler sign, no tenderness,   Genitalia: no abnormal discharge, no ulcers, no swelling.  Lymphatics: no lymphadenopathy, supraclavicular, inguinal trochlear, or axilla.   Neurology: Intact cranial nerves 2 to 12, intact sensation, intact motor function (Power, tone and reflexes). Normal DTR reflexes.   Extremities: no signs of PAD, no swelling no ulcers   Back: no deformity, no SI tenderness, no ulcers.   Skin: no signs of dehydration, no Rash, Bruises, or purpura.      AS:    Mr. Marcelino Connors is a 63 yo M w/ PMH of ESRD (MWF), HTN, and HFpEF (EF 60-65%),chronic HTN admitted initially to MICU for acute hypoxic respiratory failure 2/2 pulmonary edema d/t hypertensive emergency, and volume overload. Nephrology was consulted and patient underwent urgent HH transferred after HD to floor. pending BP control and final nephro rec.  Dc clonidine patch, start clonidine po 0.1 mg  Plan to DC after HD if nephrology clear home.     #Hypertensive emergency, improved   - /89 this AM, was 240/120 on arrival,   - s/p urgent HD   -  s/p Nitro GGT    - resume Home meds as tolerated:     -continue  hydralazine 100mg PO TID (4 AM)    -continue losartan 100mg PO daily (due 9 AM)    -continue nifedipine 60mg PO daily (due 9 AM)    -continue labetalol 200mg PO q12h (4 AM)    -continue clonidine 0.2mg id, add Imdur 60mg, dc labetalol, will give carvedilol 25 bid.   Ativan 0.5 mg PRN for anxiety      #HFpEF (EF 60-65% 3/8/2022) likely 2/2 chronic HTN  -BNP 2725 (baseline 1,629) in s/o BMI 21  -pt counseled on low Na diet  - CPAP as needed  #. ESRD 2/2 hypertensive nephropathy   -HD MWF ([facility]) via L UE AVF  -nephrology following    #Chronic anemia likely 2/2 ESRD  -At baseline,baseline Hb 10-11     #Chronic non-occlusive clot of R femoral vein  -identified on DVT US in ED (11/27)  Code Status: Full  DVT ppx:  heparin   Disp: PT/ot     Spoke to my patient, Discussed the medical, social conditions, the reason for this admission explained our plan and recommendations.  Time spent on the assessment of patient, gathering and interpreting data, review of medical record/patient history, personally reviewing radiographic imaging. With greater than 50% spent in personal discussion with patient.  Time > 30  min        Alexis Patel MD

## 2023-11-29 NOTE — NURSING NOTE
Report to Receiving RN:    Report To: Anurag  Time Report Called: 1629  Hand-Off Communication: Pt BP has been elevated the entire Tx. Currently 214/100, P 70  I gave .1mg Clonidine with no real response. 1L removed. PCP was here at end of tx and ordered pt to get Ativan as soon as he returns to his room.   Complications During Treatment: Yes, HTN  Ultrafiltration Treatment: Yes  Medications Administered During Dialysis: Yes, Clonidine  Blood Products Administered During Dialysis: No  Labs Sent During Dialysis: No  Heparin Drip Rate Changes: N/A           Last Updated: 12:25 PM by TYRA BRANTLEY

## 2023-11-29 NOTE — PROGRESS NOTES
Transitional Care Coordination Progress Note:  PLAN: Patient to discharge after HD pending nephro approval for discharge.     PAYOR: Medicare A/B    DISPO: Home no needs. ADOD today    SUPPORT/CONTACT: Erica, [phone]    Natasha Tse RN, TCC

## 2023-11-30 ENCOUNTER — PHARMACY VISIT (OUTPATIENT)
Dept: PHARMACY | Facility: CLINIC | Age: 63
End: 2023-11-30
Payer: COMMERCIAL

## 2023-11-30 VITALS
DIASTOLIC BLOOD PRESSURE: 86 MMHG | TEMPERATURE: 98.4 F | BODY MASS INDEX: 19.62 KG/M2 | SYSTOLIC BLOOD PRESSURE: 172 MMHG | WEIGHT: 122.1 LBS | RESPIRATION RATE: 18 BRPM | OXYGEN SATURATION: 96 % | HEIGHT: 66 IN | HEART RATE: 73 BPM

## 2023-11-30 LAB
ATRIAL RATE: 69 BPM
P AXIS: 65 DEGREES
P OFFSET: 183 MS
P ONSET: 128 MS
PR INTERVAL: 188 MS
Q ONSET: 222 MS
QRS COUNT: 11 BEATS
QRS DURATION: 100 MS
QT INTERVAL: 468 MS
QTC CALCULATION(BAZETT): 501 MS
QTC FREDERICIA: 490 MS
R AXIS: 83 DEGREES
T AXIS: 10 DEGREES
T OFFSET: 456 MS
VENTRICULAR RATE: 69 BPM

## 2023-11-30 PROCEDURE — RXMED WILLOW AMBULATORY MEDICATION CHARGE

## 2023-11-30 PROCEDURE — 2500000001 HC RX 250 WO HCPCS SELF ADMINISTERED DRUGS (ALT 637 FOR MEDICARE OP): Performed by: STUDENT IN AN ORGANIZED HEALTH CARE EDUCATION/TRAINING PROGRAM

## 2023-11-30 PROCEDURE — 2500000001 HC RX 250 WO HCPCS SELF ADMINISTERED DRUGS (ALT 637 FOR MEDICARE OP): Performed by: INTERNAL MEDICINE

## 2023-11-30 PROCEDURE — 99239 HOSP IP/OBS DSCHRG MGMT >30: CPT | Performed by: INTERNAL MEDICINE

## 2023-11-30 RX ORDER — CARVEDILOL 25 MG/1
25 TABLET ORAL 2 TIMES DAILY
Qty: 60 TABLET | Refills: 0 | Status: SHIPPED | OUTPATIENT
Start: 2023-11-30 | End: 2023-12-30

## 2023-11-30 RX ORDER — CLONIDINE HYDROCHLORIDE 0.2 MG/1
0.2 TABLET ORAL EVERY 12 HOURS SCHEDULED
Qty: 60 TABLET | Refills: 0 | Status: SHIPPED | OUTPATIENT
Start: 2023-11-30 | End: 2023-12-30

## 2023-11-30 RX ORDER — ISOSORBIDE MONONITRATE 120 MG/1
120 TABLET, EXTENDED RELEASE ORAL DAILY
Qty: 30 TABLET | Refills: 0 | Status: SHIPPED | OUTPATIENT
Start: 2023-11-30 | End: 2023-11-30 | Stop reason: HOSPADM

## 2023-11-30 RX ORDER — NIFEDIPINE 60 MG/1
60 TABLET, FILM COATED, EXTENDED RELEASE ORAL DAILY
Qty: 30 TABLET | Refills: 0 | Status: SHIPPED | OUTPATIENT
Start: 2023-11-30 | End: 2023-12-30

## 2023-11-30 RX ORDER — HYDRALAZINE HYDROCHLORIDE 100 MG/1
100 TABLET, FILM COATED ORAL EVERY 8 HOURS
Qty: 90 TABLET | Refills: 0 | Status: SHIPPED | OUTPATIENT
Start: 2023-11-30 | End: 2023-12-30

## 2023-11-30 RX ORDER — NAPROXEN SODIUM 220 MG/1
81 TABLET, FILM COATED ORAL DAILY
Qty: 30 TABLET | Refills: 0 | Status: SHIPPED | OUTPATIENT
Start: 2023-11-30 | End: 2023-12-30

## 2023-11-30 RX ORDER — LABETALOL 200 MG/1
1 TABLET, FILM COATED ORAL EVERY 12 HOURS
Qty: 60 TABLET | Refills: 0 | Status: SHIPPED | OUTPATIENT
Start: 2023-11-30 | End: 2023-11-30 | Stop reason: HOSPADM

## 2023-11-30 RX ORDER — ISOSORBIDE MONONITRATE 60 MG/1
60 TABLET, EXTENDED RELEASE ORAL DAILY
Qty: 30 TABLET | Refills: 0 | Status: SHIPPED | OUTPATIENT
Start: 2023-11-30 | End: 2023-12-30

## 2023-11-30 RX ORDER — LOSARTAN POTASSIUM 100 MG/1
100 TABLET ORAL DAILY
Qty: 30 TABLET | Refills: 0 | Status: SHIPPED | OUTPATIENT
Start: 2023-11-30 | End: 2023-12-30

## 2023-11-30 RX ADMIN — LOSARTAN POTASSIUM 100 MG: 100 TABLET, FILM COATED ORAL at 08:39

## 2023-11-30 RX ADMIN — ISOSORBIDE MONONITRATE 60 MG: 30 TABLET, EXTENDED RELEASE ORAL at 08:38

## 2023-11-30 RX ADMIN — CLONIDINE HYDROCHLORIDE 0.2 MG: 0.1 TABLET ORAL at 08:38

## 2023-11-30 RX ADMIN — CARVEDILOL 25 MG: 12.5 TABLET, FILM COATED ORAL at 08:38

## 2023-11-30 RX ADMIN — ATORVASTATIN CALCIUM 40 MG: 40 TABLET, FILM COATED ORAL at 08:46

## 2023-11-30 RX ADMIN — ASPIRIN 81 MG CHEWABLE TABLET 81 MG: 81 TABLET CHEWABLE at 08:37

## 2023-11-30 RX ADMIN — RENO CAPS 1 CAPSULE: 100; 1.5; 1.7; 20; 10; 1; 150; 5; 6 CAPSULE ORAL at 08:38

## 2023-11-30 RX ADMIN — HYDRALAZINE HYDROCHLORIDE 100 MG: 50 TABLET, FILM COATED ORAL at 04:57

## 2023-11-30 NOTE — ED PROCEDURE NOTE
Procedure  Critical Care    Performed by: Manish Bullard DO  Authorized by: Manish Bullard DO    Critical care provider statement:     Critical care time (minutes):  32    Critical care time was exclusive of:  Separately billable procedures and treating other patients and teaching time    Critical care was necessary to treat or prevent imminent or life-threatening deterioration of the following conditions: Hypertensive emergency.    Critical care was time spent personally by me on the following activities:  Blood draw for specimens, discussions with consultants, discussions with primary provider, evaluation of patient's response to treatment, examination of patient and re-evaluation of patient's condition    Care discussed with: admitting provider                 Manish Bullard DO  11/30/23 1015

## 2023-11-30 NOTE — NURSING NOTE
11/30/23 Pt states that he needs to leave to go out of town. Pts BP stable to discharge per Dr Conway. Pt's intact IV was removed and discharge paperwork given to pt. Pt was instructed to go down to [facility] pharmacy and  medication and to follow instructions printed on discharge paperwork. Pt refused wheelchair transport and walked off the unit. Nati Hood RN

## 2023-11-30 NOTE — DISCHARGE SUMMARY
Discharge Diagnosis  Hypertensive emergency    Issues Requiring Follow-Up  Follow up with PCP and nephrology for uncontrolled BP     Test Results Pending At Discharge  Pending Labs       No current pending labs.            Hospital Course  ESRD  HD   And BP control  Mr. Marcelino Connors is a 63 yo M w/ PMH of ESRD (MWF), HTN, and HFpEF (EF 60-65%),chronic HTN admitted initially to MICU for acute hypoxic respiratory failure 2/2 pulmonary edema d/t hypertensive emergency, and volume overload. Nephrology was consulted and patient underwent urgent HH transferred after HD to floor. pending BP control and final nephro rec.  Dc clonidine patch, start clonidine po 0.1 mg  Plan to DC after HD if nephrology clear home.      #Hypertensive emergency, improved   - /89 this AM, was 240/120 on arrival,   - s/p urgent HD   -  s/p Nitro GGT    - resume Home meds as tolerated:     -continue hydralazine 100mg PO TID (4 AM)    -continue losartan 100mg PO daily (due 9 AM)    -continue nifedipine 60mg PO daily (due 9 AM)    -continue labetalol 200mg PO q12h (4 AM)    -continue clonidine 0.2mg id, add Imdur 60mg, dc labetalol, will give carvedilol 25 bid.   Ativan 0.5 mg PRN for anxiety      #HFpEF (EF 60-65% 3/8/2022) likely 2/2 chronic HTN  -BNP 2725 (baseline 1,629) in s/o BMI 21  -pt counseled on low Na diet  - CPAP as needed  #. ESRD 2/2 hypertensive nephropathy   -HD MWF ([facility] via [facility]  -nephrology following     #Chronic anemia likely 2/2 ESRD  -At baseline,baseline Hb 10-11    Pertinent Physical Exam At Time of Discharge  Physical Exam    Home Medications     Medication List      START taking these medications     carvedilol 25 mg tablet; Commonly known as: Coreg; Take 1 tablet (25 mg)   by mouth 2 times a day.   cloNIDine 0.2 mg tablet; Commonly known as: Catapres; Take 1 tablet (0.2   mg) by mouth every 12 hours.   oxygen gas therapy; Commonly known as: O2; Inhale 1 each once every 24   hours.      CHANGE how you take these medications     * aspirin 81 mg chewable tablet; CHEW 1 TABLET BY MOUTH ONCE DAILY; What   changed: Another medication with the same name was added. Make sure you   understand how and when to take each.   * aspirin 81 mg chewable tablet; Chew 1 tablet (81 mg) once daily.; What   changed: You were already taking a medication with the same name, and this   prescription was added. Make sure you understand how and when to take   each.   hydrALAZINE 100 mg tablet; Commonly known as: Apresoline; TAKE 1 TABLET   BY MOUTH EVERY 8 HOURS; What changed: Another medication with the same   name was removed. Continue taking this medication, and follow the   directions you see here.   isosorbide mononitrate ER 60 mg 24 hr tablet; Commonly known as: Imdur;   Take 1 tablet (60 mg) by mouth once daily. Do not crush or chew.; What   changed: medication strength, how much to take, additional instructions   losartan 100 mg tablet; Commonly known as: Cozaar; TAKE 1 TABLET BY   MOUTH ONCE DAILY; What changed: Another medication with the same name was   removed. Continue taking this medication, and follow the directions you   see here.  * This list has 2 medication(s) that are the same as other medications   prescribed for you. Read the directions carefully, and ask your doctor or   other care provider to review them with you.     CONTINUE taking these medications     atorvastatin 40 mg tablet; Commonly known as: Lipitor; TAKE 1 TABLET BY   MOUTH ONCE DAILY   NIFEdipine ER 60 mg 24 hr tablet; Commonly known as: Adalat CC; TAKE 1   TABLET BY MOUTH ONCE DAILY   Renal Caps 1 mg capsule; Generic drug: B complex-vitamin C-folic acid;   TAKE 1 CAPSULE BY MOUTH ONCE DAILY     STOP taking these medications     cloNIDine 0.1 mg/24 hr patch; Commonly known as: Catapres-TTS   labetalol 200 mg tablet; Commonly known as: Normodyne       Outpatient Follow-Up  Future Appointments   Date Time Provider Department Center    12/20/2023  9:30 AM Chucky Klein MD [id number]     Time > 35 min   Alexis Patel MD

## 2023-12-07 ENCOUNTER — APPOINTMENT (OUTPATIENT)
Dept: DERMATOLOGY | Facility: CLINIC | Age: 63
End: 2023-12-07
Payer: MEDICARE